# Patient Record
Sex: MALE | Race: WHITE | Employment: OTHER | URBAN - METROPOLITAN AREA
[De-identification: names, ages, dates, MRNs, and addresses within clinical notes are randomized per-mention and may not be internally consistent; named-entity substitution may affect disease eponyms.]

---

## 2017-01-01 ENCOUNTER — HOSPITAL ENCOUNTER (OUTPATIENT)
Dept: CT IMAGING | Age: 67
Discharge: HOME | End: 2017-10-04
Attending: INTERNAL MEDICINE | Admitting: INTERNAL MEDICINE

## 2017-01-01 ENCOUNTER — HOSPITAL ENCOUNTER (OUTPATIENT)
Dept: CT IMAGING | Age: 67
Discharge: HOME | End: 2017-09-08
Attending: INTERNAL MEDICINE | Admitting: INTERNAL MEDICINE

## 2017-01-01 DIAGNOSIS — C25.2 MALIGNANT NEOPLASM OF TAIL OF PANCREAS (HCC): ICD-10-CM

## 2017-01-01 DIAGNOSIS — C25.2 CANCER OF PANCREAS, TAIL (HCC): ICD-10-CM

## 2017-01-01 LAB
ALBUMIN SERPL-MCNC: 4 G/DL (ref 3.4–5)
ANION GAP SERPL CALCULATED.3IONS-SCNC: 7 MMOL/L (ref 3–16)
BUN BLDV-MCNC: 15 MG/DL (ref 7–20)
CALCIUM SERPL-MCNC: 8.7 MG/DL (ref 8.3–10.6)
CHLORIDE BLD-SCNC: 106 MMOL/L (ref 99–110)
CO2: 27 MMOL/L (ref 21–32)
CREAT SERPL-MCNC: 0.8 MG/DL (ref 0.8–1.3)
GFR AFRICAN AMERICAN: >60
GFR NON-AFRICAN AMERICAN: >60
GLUCOSE BLD-MCNC: 88 MG/DL (ref 70–99)
PHOSPHORUS: 3.6 MG/DL (ref 2.5–4.9)
POTASSIUM SERPL-SCNC: 4.7 MMOL/L (ref 3.5–5.1)
SODIUM BLD-SCNC: 140 MMOL/L (ref 136–145)

## 2017-01-03 ENCOUNTER — HOSPITAL ENCOUNTER (OUTPATIENT)
Dept: CT IMAGING | Age: 67
Discharge: HOME | End: 2017-01-03

## 2017-01-03 ENCOUNTER — HOSPITAL ENCOUNTER (OUTPATIENT)
Dept: OTHER | Age: 67
Discharge: HOME | End: 2017-01-03
Attending: INTERNAL MEDICINE | Admitting: INTERNAL MEDICINE

## 2017-01-03 VITALS
RESPIRATION RATE: 14 BRPM | OXYGEN SATURATION: 96 % | SYSTOLIC BLOOD PRESSURE: 108 MMHG | HEIGHT: 70 IN | TEMPERATURE: 73 F | WEIGHT: 170 LBS | DIASTOLIC BLOOD PRESSURE: 67 MMHG | HEART RATE: 77 BPM | BODY MASS INDEX: 24.34 KG/M2

## 2017-01-03 DIAGNOSIS — D50.0 IRON DEFICIENCY ANEMIA DUE TO CHRONIC BLOOD LOSS: ICD-10-CM

## 2017-01-03 DIAGNOSIS — D64.9 ANEMIA: ICD-10-CM

## 2017-01-03 LAB
INR BLD: 1.28 (ref 0.85–1.16)
PROTHROMBIN TIME: 14.7 SEC (ref 9.8–13)

## 2017-01-03 RX ORDER — MIDAZOLAM HYDROCHLORIDE 1 MG/ML
1 INJECTION INTRAMUSCULAR; INTRAVENOUS ONCE
Status: COMPLETED | OUTPATIENT
Start: 2017-01-03 | End: 2017-01-03

## 2017-01-03 RX ORDER — SODIUM CHLORIDE 9 MG/ML
INJECTION, SOLUTION INTRAVENOUS ONCE
Status: COMPLETED | OUTPATIENT
Start: 2017-01-03 | End: 2017-01-03

## 2017-01-03 RX ORDER — FENTANYL CITRATE 50 UG/ML
25 INJECTION, SOLUTION INTRAMUSCULAR; INTRAVENOUS ONCE
Status: COMPLETED | OUTPATIENT
Start: 2017-01-03 | End: 2017-01-03

## 2017-01-03 RX ADMIN — MIDAZOLAM HYDROCHLORIDE 1 MG: 1 INJECTION INTRAMUSCULAR; INTRAVENOUS at 10:38

## 2017-01-03 RX ADMIN — SODIUM CHLORIDE: 9 INJECTION, SOLUTION INTRAVENOUS at 09:30

## 2017-01-03 RX ADMIN — FENTANYL CITRATE 25 MCG: 50 INJECTION, SOLUTION INTRAMUSCULAR; INTRAVENOUS at 10:39

## 2017-01-03 ASSESSMENT — PAIN SCALES - GENERAL
PAINLEVEL_OUTOF10: 0
PAINLEVEL_OUTOF10: 0

## 2017-01-03 ASSESSMENT — PAIN - FUNCTIONAL ASSESSMENT: PAIN_FUNCTIONAL_ASSESSMENT: 0-10

## 2017-01-04 ENCOUNTER — HOSPITAL ENCOUNTER (OUTPATIENT)
Dept: CT IMAGING | Age: 67
Discharge: HOME | End: 2017-01-04
Attending: INTERNAL MEDICINE | Admitting: INTERNAL MEDICINE

## 2017-01-04 DIAGNOSIS — D64.9 ANEMIA: ICD-10-CM

## 2017-01-12 ENCOUNTER — HOSPITAL ENCOUNTER (OUTPATIENT)
Dept: SURGERY | Age: 67
Discharge: HOME | End: 2017-01-12
Attending: INTERNAL MEDICINE | Admitting: INTERNAL MEDICINE

## 2017-01-12 VITALS
WEIGHT: 171 LBS | HEIGHT: 70 IN | OXYGEN SATURATION: 94 % | SYSTOLIC BLOOD PRESSURE: 110 MMHG | BODY MASS INDEX: 24.48 KG/M2 | HEART RATE: 79 BPM | TEMPERATURE: 98.5 F | DIASTOLIC BLOOD PRESSURE: 77 MMHG | RESPIRATION RATE: 14 BRPM

## 2017-01-12 PROBLEM — C78.7 LIVER METASTASIS (HCC): Status: ACTIVE | Noted: 2017-01-12

## 2017-01-12 LAB
ABO/RH: NORMAL
ANTIBODY SCREEN: NORMAL
BASOPHILS ABSOLUTE: 0.1 K/UL (ref 0–0.2)
BASOPHILS RELATIVE PERCENT: 0.5 %
EOSINOPHILS ABSOLUTE: 0.2 K/UL (ref 0–0.6)
EOSINOPHILS RELATIVE PERCENT: 1.7 %
HCT VFR BLD CALC: 21.4 % (ref 40.5–52.5)
HEMOGLOBIN: 6.9 G/DL (ref 13.5–17.5)
LYMPHOCYTES ABSOLUTE: 0.7 K/UL (ref 1–5.1)
LYMPHOCYTES RELATIVE PERCENT: 6.6 %
MCH RBC QN AUTO: 26.1 PG (ref 26–34)
MCHC RBC AUTO-ENTMCNC: 32.3 G/DL (ref 31–36)
MCV RBC AUTO: 80.9 FL (ref 80–100)
MONOCYTES ABSOLUTE: 0.9 K/UL (ref 0–1.3)
MONOCYTES RELATIVE PERCENT: 8.5 %
NEUTROPHILS ABSOLUTE: 9 K/UL (ref 1.7–7.7)
NEUTROPHILS RELATIVE PERCENT: 82.7 %
PDW BLD-RTO: 19.2 % (ref 12.4–15.4)
PLATELET # BLD: 211 K/UL (ref 135–450)
PMV BLD AUTO: 6.7 FL (ref 5–10.5)
RBC # BLD: 2.64 M/UL (ref 4.2–5.9)
WBC # BLD: 10.9 K/UL (ref 4–11)

## 2017-01-12 RX ORDER — LIDOCAINE HYDROCHLORIDE 10 MG/ML
0.1 INJECTION, SOLUTION EPIDURAL; INFILTRATION; INTRACAUDAL; PERINEURAL ONCE
Status: DISCONTINUED | OUTPATIENT
Start: 2017-01-12 | End: 2017-01-13 | Stop reason: HOSPADM

## 2017-01-12 RX ORDER — SODIUM CHLORIDE, SODIUM LACTATE, POTASSIUM CHLORIDE, CALCIUM CHLORIDE 600; 310; 30; 20 MG/100ML; MG/100ML; MG/100ML; MG/100ML
INJECTION, SOLUTION INTRAVENOUS CONTINUOUS
Status: DISCONTINUED | OUTPATIENT
Start: 2017-01-12 | End: 2017-01-13 | Stop reason: HOSPADM

## 2017-01-12 RX ADMIN — SODIUM CHLORIDE, SODIUM LACTATE, POTASSIUM CHLORIDE, CALCIUM CHLORIDE: 600; 310; 30; 20 INJECTION, SOLUTION INTRAVENOUS at 10:32

## 2017-01-12 ASSESSMENT — PAIN SCALES - GENERAL: PAINLEVEL_OUTOF10: 0

## 2017-01-13 ENCOUNTER — HOSPITAL ENCOUNTER (OUTPATIENT)
Dept: ONCOLOGY | Age: 67
Discharge: HOME | End: 2017-01-31
Admitting: INTERNAL MEDICINE

## 2017-01-13 VITALS
BODY MASS INDEX: 24.34 KG/M2 | RESPIRATION RATE: 16 BRPM | DIASTOLIC BLOOD PRESSURE: 72 MMHG | HEART RATE: 82 BPM | WEIGHT: 170 LBS | HEIGHT: 70 IN | TEMPERATURE: 98.1 F | SYSTOLIC BLOOD PRESSURE: 106 MMHG

## 2017-01-13 LAB
BLOOD BANK DISPENSE STATUS: NORMAL
BLOOD BANK DISPENSE STATUS: NORMAL
BLOOD BANK PRODUCT CODE: NORMAL
BLOOD BANK PRODUCT CODE: NORMAL
BPU ID: NORMAL
BPU ID: NORMAL
DESCRIPTION BLOOD BANK: NORMAL
DESCRIPTION BLOOD BANK: NORMAL

## 2017-01-13 RX ORDER — ACETAMINOPHEN 325 MG/1
650 TABLET ORAL ONCE
Status: COMPLETED | OUTPATIENT
Start: 2017-01-13 | End: 2017-01-13

## 2017-01-13 RX ORDER — DIPHENHYDRAMINE HCL 25 MG
25 TABLET ORAL ONCE
Status: COMPLETED | OUTPATIENT
Start: 2017-01-13 | End: 2017-01-13

## 2017-01-13 RX ADMIN — Medication 25 MG: at 08:27

## 2017-01-13 RX ADMIN — ACETAMINOPHEN 650 MG: 325 TABLET ORAL at 08:27

## 2017-01-13 ASSESSMENT — PAIN SCALES - GENERAL: PAINLEVEL_OUTOF10: 0

## 2017-01-19 PROBLEM — E46 PROTEIN CALORIE MALNUTRITION (HCC): Status: ACTIVE | Noted: 2017-01-19

## 2017-01-19 PROBLEM — R63.4 WEIGHT LOSS: Status: ACTIVE | Noted: 2017-01-19

## 2017-01-31 ENCOUNTER — SURG/PROC ORDERS (OUTPATIENT)
Dept: SURGERY | Age: 67
End: 2017-01-31

## 2017-01-31 ENCOUNTER — TELEPHONE (OUTPATIENT)
Dept: SURGERY | Age: 67
End: 2017-01-31

## 2017-01-31 RX ORDER — SODIUM CHLORIDE 0.9 % (FLUSH) 0.9 %
10 SYRINGE (ML) INJECTION PRN
Status: CANCELLED | OUTPATIENT
Start: 2017-01-31

## 2017-01-31 RX ORDER — SODIUM CHLORIDE 0.9 % (FLUSH) 0.9 %
10 SYRINGE (ML) INJECTION EVERY 12 HOURS SCHEDULED
Status: CANCELLED | OUTPATIENT
Start: 2017-01-31

## 2017-02-01 ENCOUNTER — HOSPITAL ENCOUNTER (OUTPATIENT)
Dept: SURGERY | Age: 67
Discharge: HOME | End: 2017-02-01
Attending: SURGERY | Admitting: SURGERY

## 2017-02-01 VITALS
WEIGHT: 154.2 LBS | DIASTOLIC BLOOD PRESSURE: 57 MMHG | HEIGHT: 70 IN | HEART RATE: 86 BPM | TEMPERATURE: 98.1 F | BODY MASS INDEX: 22.07 KG/M2 | RESPIRATION RATE: 16 BRPM | SYSTOLIC BLOOD PRESSURE: 115 MMHG | OXYGEN SATURATION: 99 %

## 2017-02-01 DIAGNOSIS — R52 PAIN: ICD-10-CM

## 2017-02-01 LAB
INR BLD: 1.17 (ref 0.85–1.16)
PROTHROMBIN TIME: 13.4 SEC (ref 9.8–13)

## 2017-02-01 PROCEDURE — 77001 FLUOROGUIDE FOR VEIN DEVICE: CPT | Performed by: SURGERY

## 2017-02-01 PROCEDURE — 36561 INSERT TUNNELED CV CATH: CPT | Performed by: SURGERY

## 2017-02-01 RX ORDER — LABETALOL HYDROCHLORIDE 5 MG/ML
5 INJECTION, SOLUTION INTRAVENOUS EVERY 10 MIN PRN
Status: DISCONTINUED | OUTPATIENT
Start: 2017-02-01 | End: 2017-02-02 | Stop reason: HOSPADM

## 2017-02-01 RX ORDER — PROMETHAZINE HYDROCHLORIDE 25 MG/ML
6.25 INJECTION, SOLUTION INTRAMUSCULAR; INTRAVENOUS
Status: DISCONTINUED | OUTPATIENT
Start: 2017-02-01 | End: 2017-02-02 | Stop reason: HOSPADM

## 2017-02-01 RX ORDER — ONDANSETRON 2 MG/ML
4 INJECTION INTRAMUSCULAR; INTRAVENOUS PRN
Status: DISCONTINUED | OUTPATIENT
Start: 2017-02-01 | End: 2017-02-02 | Stop reason: HOSPADM

## 2017-02-01 RX ORDER — LIDOCAINE HYDROCHLORIDE 10 MG/ML
0.3 INJECTION, SOLUTION EPIDURAL; INFILTRATION; INTRACAUDAL; PERINEURAL
Status: ACTIVE | OUTPATIENT
Start: 2017-02-01 | End: 2017-02-01

## 2017-02-01 RX ORDER — HYDRALAZINE HYDROCHLORIDE 20 MG/ML
5 INJECTION INTRAMUSCULAR; INTRAVENOUS EVERY 10 MIN PRN
Status: DISCONTINUED | OUTPATIENT
Start: 2017-02-01 | End: 2017-02-02 | Stop reason: HOSPADM

## 2017-02-01 RX ORDER — MEPERIDINE HYDROCHLORIDE 50 MG/ML
12.5 INJECTION INTRAMUSCULAR; INTRAVENOUS; SUBCUTANEOUS EVERY 5 MIN PRN
Status: DISCONTINUED | OUTPATIENT
Start: 2017-02-01 | End: 2017-02-02 | Stop reason: HOSPADM

## 2017-02-01 RX ORDER — OXYCODONE HYDROCHLORIDE AND ACETAMINOPHEN 5; 325 MG/1; MG/1
2 TABLET ORAL PRN
Status: DISPENSED | OUTPATIENT
Start: 2017-02-01 | End: 2017-02-01

## 2017-02-01 RX ORDER — OXYCODONE HYDROCHLORIDE AND ACETAMINOPHEN 5; 325 MG/1; MG/1
1 TABLET ORAL PRN
Status: ACTIVE | OUTPATIENT
Start: 2017-02-01 | End: 2017-02-01

## 2017-02-01 RX ORDER — SODIUM CHLORIDE 0.9 % (FLUSH) 0.9 %
10 SYRINGE (ML) INJECTION EVERY 12 HOURS SCHEDULED
Status: DISCONTINUED | OUTPATIENT
Start: 2017-02-01 | End: 2017-02-02 | Stop reason: HOSPADM

## 2017-02-01 RX ORDER — SODIUM CHLORIDE, SODIUM LACTATE, POTASSIUM CHLORIDE, CALCIUM CHLORIDE 600; 310; 30; 20 MG/100ML; MG/100ML; MG/100ML; MG/100ML
INJECTION, SOLUTION INTRAVENOUS CONTINUOUS
Status: DISCONTINUED | OUTPATIENT
Start: 2017-02-01 | End: 2017-02-02 | Stop reason: HOSPADM

## 2017-02-01 RX ORDER — DIPHENHYDRAMINE HYDROCHLORIDE 50 MG/ML
12.5 INJECTION INTRAMUSCULAR; INTRAVENOUS
Status: ACTIVE | OUTPATIENT
Start: 2017-02-01 | End: 2017-02-01

## 2017-02-01 RX ORDER — MORPHINE SULFATE 2 MG/ML
2 INJECTION, SOLUTION INTRAMUSCULAR; INTRAVENOUS EVERY 5 MIN PRN
Status: DISCONTINUED | OUTPATIENT
Start: 2017-02-01 | End: 2017-02-02 | Stop reason: HOSPADM

## 2017-02-01 RX ORDER — SODIUM CHLORIDE 0.9 % (FLUSH) 0.9 %
10 SYRINGE (ML) INJECTION PRN
Status: DISCONTINUED | OUTPATIENT
Start: 2017-02-01 | End: 2017-02-02 | Stop reason: HOSPADM

## 2017-02-01 RX ORDER — MORPHINE SULFATE 2 MG/ML
1 INJECTION, SOLUTION INTRAMUSCULAR; INTRAVENOUS EVERY 5 MIN PRN
Status: DISCONTINUED | OUTPATIENT
Start: 2017-02-01 | End: 2017-02-02 | Stop reason: HOSPADM

## 2017-02-01 ASSESSMENT — PAIN SCALES - GENERAL: PAINLEVEL_OUTOF10: 3

## 2017-02-01 ASSESSMENT — PAIN - FUNCTIONAL ASSESSMENT: PAIN_FUNCTIONAL_ASSESSMENT: 0-10

## 2017-02-01 ASSESSMENT — PAIN DESCRIPTION - PAIN TYPE: TYPE: SURGICAL PAIN

## 2017-02-01 ASSESSMENT — PAIN DESCRIPTION - ORIENTATION: ORIENTATION: LEFT

## 2017-02-01 ASSESSMENT — PAIN DESCRIPTION - LOCATION: LOCATION: CHEST;INCISION

## 2017-02-01 ASSESSMENT — PAIN DESCRIPTION - DESCRIPTORS: DESCRIPTORS: ACHING

## 2017-02-03 ENCOUNTER — HOSPITAL ENCOUNTER (OUTPATIENT)
Dept: VASCULAR LAB | Age: 67
Discharge: HOME | End: 2017-02-03
Attending: INTERNAL MEDICINE | Admitting: INTERNAL MEDICINE

## 2017-02-03 DIAGNOSIS — R60.0 LOCALIZED EDEMA: ICD-10-CM

## 2017-02-11 PROBLEM — R55 POSTURAL DIZZINESS WITH PRESYNCOPE: Status: ACTIVE | Noted: 2017-02-11

## 2017-02-11 PROBLEM — I95.1 ORTHOSTATIC HYPOTENSION: Status: ACTIVE | Noted: 2017-02-11

## 2017-02-11 PROBLEM — D62 ACUTE BLOOD LOSS ANEMIA: Status: ACTIVE | Noted: 2017-02-11

## 2017-02-11 PROBLEM — R42 POSTURAL DIZZINESS WITH PRESYNCOPE: Status: ACTIVE | Noted: 2017-02-11

## 2017-02-12 PROBLEM — K92.2 GASTROINTESTINAL BLEEDING, UPPER: Chronic | Status: ACTIVE | Noted: 2017-02-12

## 2017-03-02 LAB
ABO/RH: NORMAL
ANTIBODY SCREEN: NORMAL

## 2017-03-03 ENCOUNTER — HOSPITAL ENCOUNTER (OUTPATIENT)
Dept: ONCOLOGY | Age: 67
Discharge: HOME | End: 2017-03-22
Attending: NURSE PRACTITIONER | Admitting: NURSE PRACTITIONER

## 2017-03-03 VITALS
TEMPERATURE: 97.4 F | HEART RATE: 68 BPM | SYSTOLIC BLOOD PRESSURE: 98 MMHG | BODY MASS INDEX: 20.9 KG/M2 | RESPIRATION RATE: 16 BRPM | WEIGHT: 146 LBS | HEIGHT: 70 IN | DIASTOLIC BLOOD PRESSURE: 51 MMHG

## 2017-03-03 RX ORDER — DIPHENHYDRAMINE HCL 25 MG
25 TABLET ORAL ONCE
Status: COMPLETED | OUTPATIENT
Start: 2017-03-03 | End: 2017-03-03

## 2017-03-03 RX ORDER — ACETAMINOPHEN 325 MG/1
650 TABLET ORAL ONCE
Status: COMPLETED | OUTPATIENT
Start: 2017-03-03 | End: 2017-03-03

## 2017-03-03 RX ADMIN — Medication 25 MG: at 09:05

## 2017-03-03 RX ADMIN — ACETAMINOPHEN 650 MG: 325 TABLET ORAL at 09:04

## 2017-03-03 ASSESSMENT — PAIN - FUNCTIONAL ASSESSMENT: PAIN_FUNCTIONAL_ASSESSMENT: 0-10

## 2017-03-03 ASSESSMENT — PAIN SCALES - GENERAL: PAINLEVEL_OUTOF10: 0

## 2017-05-31 ENCOUNTER — HOSPITAL ENCOUNTER (OUTPATIENT)
Dept: CT IMAGING | Age: 67
Discharge: HOME | End: 2017-05-31
Attending: NURSE PRACTITIONER | Admitting: NURSE PRACTITIONER

## 2017-05-31 DIAGNOSIS — C78.7 LIVER METASTASIS (HCC): ICD-10-CM

## 2017-05-31 DIAGNOSIS — C78.7 SECONDARY MALIGNANT NEOPLASM OF LIVER AND INTRAHEPATIC BILE DUCT (HCC): ICD-10-CM

## 2017-05-31 DIAGNOSIS — C25.2 MALIGNANT NEOPLASM OF TAIL OF PANCREAS (HCC): ICD-10-CM

## 2017-12-07 PROBLEM — K92.2 ACUTE GI BLEEDING: Status: ACTIVE | Noted: 2017-01-01

## 2018-01-01 ENCOUNTER — ANESTHESIA (OUTPATIENT)
Dept: OPERATING ROOM | Age: 68
DRG: 330 | End: 2018-01-01
Payer: MEDICARE

## 2018-01-01 ENCOUNTER — APPOINTMENT (OUTPATIENT)
Dept: CT IMAGING | Age: 68
DRG: 871 | End: 2018-01-01
Payer: MEDICARE

## 2018-01-01 ENCOUNTER — ANESTHESIA EVENT (OUTPATIENT)
Dept: OPERATING ROOM | Age: 68
DRG: 330 | End: 2018-01-01
Payer: MEDICARE

## 2018-01-01 ENCOUNTER — APPOINTMENT (OUTPATIENT)
Dept: GENERAL RADIOLOGY | Age: 68
DRG: 330 | End: 2018-01-01
Payer: MEDICARE

## 2018-01-01 ENCOUNTER — HOSPITAL ENCOUNTER (OUTPATIENT)
Dept: CT IMAGING | Age: 68
Discharge: HOME | End: 2018-04-18
Attending: INTERNAL MEDICINE | Admitting: INTERNAL MEDICINE

## 2018-01-01 ENCOUNTER — APPOINTMENT (OUTPATIENT)
Dept: GENERAL RADIOLOGY | Age: 68
DRG: 871 | End: 2018-01-01
Payer: MEDICARE

## 2018-01-01 ENCOUNTER — HOSPITAL ENCOUNTER (OUTPATIENT)
Dept: CT IMAGING | Age: 68
Discharge: HOME | End: 2018-01-22
Attending: INTERNAL MEDICINE | Admitting: INTERNAL MEDICINE

## 2018-01-01 ENCOUNTER — APPOINTMENT (OUTPATIENT)
Dept: CT IMAGING | Age: 68
DRG: 330 | End: 2018-01-01
Payer: MEDICARE

## 2018-01-01 ENCOUNTER — HOSPITAL ENCOUNTER (INPATIENT)
Age: 68
LOS: 8 days | Discharge: HOME HEALTH | DRG: 330 | End: 2018-07-25
Attending: EMERGENCY MEDICINE | Admitting: INTERNAL MEDICINE
Payer: MEDICARE

## 2018-01-01 ENCOUNTER — OFFICE VISIT (OUTPATIENT)
Dept: SURGERY | Age: 68
End: 2018-01-01

## 2018-01-01 ENCOUNTER — HOSPITAL ENCOUNTER (INPATIENT)
Age: 68
LOS: 3 days | Discharge: HOSPICE | DRG: 871 | End: 2018-08-16
Attending: EMERGENCY MEDICINE | Admitting: INTERNAL MEDICINE
Payer: MEDICARE

## 2018-01-01 VITALS
TEMPERATURE: 98.4 F | RESPIRATION RATE: 22 BRPM | DIASTOLIC BLOOD PRESSURE: 72 MMHG | BODY MASS INDEX: 20 KG/M2 | OXYGEN SATURATION: 96 % | HEIGHT: 71 IN | SYSTOLIC BLOOD PRESSURE: 109 MMHG | WEIGHT: 142.86 LBS | HEART RATE: 113 BPM

## 2018-01-01 VITALS
HEART RATE: 99 BPM | RESPIRATION RATE: 16 BRPM | WEIGHT: 153.22 LBS | OXYGEN SATURATION: 96 % | HEIGHT: 71 IN | TEMPERATURE: 98.8 F | DIASTOLIC BLOOD PRESSURE: 72 MMHG | SYSTOLIC BLOOD PRESSURE: 107 MMHG | BODY MASS INDEX: 21.45 KG/M2

## 2018-01-01 VITALS
BODY MASS INDEX: 20.16 KG/M2 | SYSTOLIC BLOOD PRESSURE: 105 MMHG | HEART RATE: 114 BPM | WEIGHT: 144 LBS | HEIGHT: 71 IN | DIASTOLIC BLOOD PRESSURE: 62 MMHG

## 2018-01-01 VITALS
TEMPERATURE: 98.6 F | DIASTOLIC BLOOD PRESSURE: 79 MMHG | SYSTOLIC BLOOD PRESSURE: 129 MMHG | OXYGEN SATURATION: 100 % | RESPIRATION RATE: 24 BRPM

## 2018-01-01 DIAGNOSIS — K92.0 HEMATEMESIS WITH NAUSEA: Primary | ICD-10-CM

## 2018-01-01 DIAGNOSIS — C25.9 PANCREATIC CANCER METASTASIZED TO LIVER (HCC): ICD-10-CM

## 2018-01-01 DIAGNOSIS — C78.7 LIVER METASTASIS (HCC): ICD-10-CM

## 2018-01-01 DIAGNOSIS — R65.21 SEPTIC SHOCK (HCC): Primary | ICD-10-CM

## 2018-01-01 DIAGNOSIS — A41.9 SEPTIC SHOCK (HCC): Primary | ICD-10-CM

## 2018-01-01 DIAGNOSIS — C25.2 MALIGNANT NEOPLASM OF TAIL OF PANCREAS (HCC): ICD-10-CM

## 2018-01-01 DIAGNOSIS — J18.9 PNEUMONIA OF LEFT LOWER LOBE DUE TO INFECTIOUS ORGANISM: ICD-10-CM

## 2018-01-01 DIAGNOSIS — N30.00 ACUTE CYSTITIS WITHOUT HEMATURIA: ICD-10-CM

## 2018-01-01 DIAGNOSIS — C78.7 SECONDARY MALIGNANT NEOPLASM OF LIVER (HCC): ICD-10-CM

## 2018-01-01 DIAGNOSIS — C78.7 PANCREATIC CANCER METASTASIZED TO LIVER (HCC): ICD-10-CM

## 2018-01-01 DIAGNOSIS — C25.2 CANCER OF PANCREAS, TAIL (HCC): ICD-10-CM

## 2018-01-01 DIAGNOSIS — F41.9 ANXIETY: ICD-10-CM

## 2018-01-01 DIAGNOSIS — C78.7 SECONDARY MALIGNANT NEOPLASM OF LIVER AND INTRAHEPATIC BILE DUCT (HCC): ICD-10-CM

## 2018-01-01 DIAGNOSIS — K56.609 COLONIC OBSTRUCTION (HCC): Primary | ICD-10-CM

## 2018-01-01 LAB
A/G RATIO: 0.6 (ref 1.1–2.2)
A/G RATIO: 0.9 (ref 1.1–2.2)
A/G RATIO: 0.9 (ref 1.1–2.2)
A/G RATIO: 1 (ref 1.1–2.2)
ABO/RH: NORMAL
ABO/RH: NORMAL
ALBUMIN SERPL-MCNC: 2.2 G/DL (ref 3.4–5)
ALBUMIN SERPL-MCNC: 2.6 G/DL (ref 3.4–5)
ALBUMIN SERPL-MCNC: 3.2 G/DL (ref 3.4–5)
ALBUMIN SERPL-MCNC: 3.3 G/DL (ref 3.4–5)
ALP BLD-CCNC: 222 U/L (ref 40–129)
ALP BLD-CCNC: 248 U/L (ref 40–129)
ALP BLD-CCNC: 324 U/L (ref 40–129)
ALP BLD-CCNC: 435 U/L (ref 40–129)
ALT SERPL-CCNC: 11 U/L (ref 10–40)
ALT SERPL-CCNC: 15 U/L (ref 10–40)
ALT SERPL-CCNC: 33 U/L (ref 10–40)
ALT SERPL-CCNC: 8 U/L (ref 10–40)
AMMONIA: 29 UMOL/L (ref 16–60)
AMORPHOUS: ABNORMAL /HPF
ANION GAP SERPL CALCULATED.3IONS-SCNC: 11 MMOL/L (ref 3–16)
ANION GAP SERPL CALCULATED.3IONS-SCNC: 17 MMOL/L (ref 3–16)
ANION GAP SERPL CALCULATED.3IONS-SCNC: 6 MMOL/L (ref 3–16)
ANION GAP SERPL CALCULATED.3IONS-SCNC: 7 MMOL/L (ref 3–16)
ANION GAP SERPL CALCULATED.3IONS-SCNC: 7 MMOL/L (ref 3–16)
ANION GAP SERPL CALCULATED.3IONS-SCNC: 8 MMOL/L (ref 3–16)
ANION GAP SERPL CALCULATED.3IONS-SCNC: 8 MMOL/L (ref 3–16)
ANION GAP SERPL CALCULATED.3IONS-SCNC: 9 MMOL/L (ref 3–16)
ANISOCYTOSIS: ABNORMAL
ANTIBODY SCREEN: NORMAL
ANTIBODY SCREEN: NORMAL
APPEARANCE FLUID: NORMAL
APTT: 29.3 SEC (ref 26–36)
APTT: 35.4 SEC (ref 26–36)
AST SERPL-CCNC: 16 U/L (ref 15–37)
AST SERPL-CCNC: 23 U/L (ref 15–37)
AST SERPL-CCNC: 24 U/L (ref 15–37)
AST SERPL-CCNC: 26 U/L (ref 15–37)
BACTERIA: ABNORMAL /HPF
BANDED NEUTROPHILS RELATIVE PERCENT: 12 % (ref 0–7)
BANDED NEUTROPHILS RELATIVE PERCENT: 51 % (ref 0–7)
BASE EXCESS VENOUS: -5 MMOL/L (ref -3–3)
BASOPHILS ABSOLUTE: 0 K/UL (ref 0–0.2)
BASOPHILS ABSOLUTE: 0.2 K/UL (ref 0–0.2)
BASOPHILS RELATIVE PERCENT: 0 %
BASOPHILS RELATIVE PERCENT: 0 %
BASOPHILS RELATIVE PERCENT: 0.1 %
BASOPHILS RELATIVE PERCENT: 0.1 %
BASOPHILS RELATIVE PERCENT: 0.2 %
BASOPHILS RELATIVE PERCENT: 1.3 %
BILIRUB SERPL-MCNC: 0.4 MG/DL (ref 0–1)
BILIRUB SERPL-MCNC: 0.4 MG/DL (ref 0–1)
BILIRUB SERPL-MCNC: 1.4 MG/DL (ref 0–1)
BILIRUB SERPL-MCNC: <0.2 MG/DL (ref 0–1)
BILIRUBIN URINE: NEGATIVE
BILIRUBIN URINE: NEGATIVE
BLOOD BANK DISPENSE STATUS: NORMAL
BLOOD BANK PRODUCT CODE: NORMAL
BLOOD CULTURE, ROUTINE: ABNORMAL
BLOOD, URINE: ABNORMAL
BLOOD, URINE: NEGATIVE
BODY FLUID CULTURE, STERILE: NORMAL
BPU ID: NORMAL
BUN BLDV-MCNC: 10 MG/DL (ref 7–20)
BUN BLDV-MCNC: 10 MG/DL (ref 7–20)
BUN BLDV-MCNC: 11 MG/DL (ref 7–20)
BUN BLDV-MCNC: 11 MG/DL (ref 7–20)
BUN BLDV-MCNC: 12 MG/DL (ref 7–20)
BUN BLDV-MCNC: 16 MG/DL (ref 7–20)
BUN BLDV-MCNC: 17 MG/DL (ref 7–20)
BUN BLDV-MCNC: 21 MG/DL (ref 7–20)
BUN BLDV-MCNC: 21 MG/DL (ref 7–20)
BUN BLDV-MCNC: 56 MG/DL (ref 7–20)
BUN BLDV-MCNC: 61 MG/DL (ref 7–20)
BURR CELLS: ABNORMAL
CA 19-9: 1995 U/ML (ref 0–35)
CALCIUM SERPL-MCNC: 11.5 MG/DL (ref 8.3–10.6)
CALCIUM SERPL-MCNC: 13.9 MG/DL (ref 8.3–10.6)
CALCIUM SERPL-MCNC: 8.3 MG/DL (ref 8.3–10.6)
CALCIUM SERPL-MCNC: 8.5 MG/DL (ref 8.3–10.6)
CALCIUM SERPL-MCNC: 8.5 MG/DL (ref 8.3–10.6)
CALCIUM SERPL-MCNC: 8.6 MG/DL (ref 8.3–10.6)
CALCIUM SERPL-MCNC: 8.7 MG/DL (ref 8.3–10.6)
CALCIUM SERPL-MCNC: 8.8 MG/DL (ref 8.3–10.6)
CALCIUM SERPL-MCNC: 9 MG/DL (ref 8.3–10.6)
CALCIUM SERPL-MCNC: 9.1 MG/DL (ref 8.3–10.6)
CALCIUM SERPL-MCNC: 9.1 MG/DL (ref 8.3–10.6)
CARBOXYHEMOGLOBIN: 2.3 % (ref 0–1.5)
CELL COUNT FLUID TYPE: NORMAL
CHLORIDE BLD-SCNC: 100 MMOL/L (ref 99–110)
CHLORIDE BLD-SCNC: 100 MMOL/L (ref 99–110)
CHLORIDE BLD-SCNC: 101 MMOL/L (ref 99–110)
CHLORIDE BLD-SCNC: 103 MMOL/L (ref 99–110)
CHLORIDE BLD-SCNC: 104 MMOL/L (ref 99–110)
CHLORIDE BLD-SCNC: 104 MMOL/L (ref 99–110)
CHLORIDE BLD-SCNC: 105 MMOL/L (ref 99–110)
CHLORIDE BLD-SCNC: 105 MMOL/L (ref 99–110)
CHLORIDE BLD-SCNC: 98 MMOL/L (ref 99–110)
CLARITY: ABNORMAL
CLARITY: CLEAR
CLOT EVALUATION: NORMAL
CO2: 20 MMOL/L (ref 21–32)
CO2: 22 MMOL/L (ref 21–32)
CO2: 24 MMOL/L (ref 21–32)
CO2: 24 MMOL/L (ref 21–32)
CO2: 25 MMOL/L (ref 21–32)
CO2: 27 MMOL/L (ref 21–32)
COLOR FLUID: NORMAL
COLOR: YELLOW
COLOR: YELLOW
CREAT SERPL-MCNC: 0.6 MG/DL (ref 0.8–1.3)
CREAT SERPL-MCNC: 0.7 MG/DL (ref 0.8–1.3)
CREAT SERPL-MCNC: 0.8 MG/DL (ref 0.8–1.3)
CREAT SERPL-MCNC: 1 MG/DL (ref 0.8–1.3)
CREAT SERPL-MCNC: 2.6 MG/DL (ref 0.8–1.3)
CREAT SERPL-MCNC: 2.7 MG/DL (ref 0.8–1.3)
CREAT SERPL-MCNC: <0.5 MG/DL (ref 0.8–1.3)
DESCRIPTION BLOOD BANK: NORMAL
EKG ATRIAL RATE: 145 BPM
EKG ATRIAL RATE: 71 BPM
EKG DIAGNOSIS: NORMAL
EKG DIAGNOSIS: NORMAL
EKG P AXIS: 64 DEGREES
EKG P-R INTERVAL: 128 MS
EKG P-R INTERVAL: 174 MS
EKG Q-T INTERVAL: 326 MS
EKG Q-T INTERVAL: 416 MS
EKG QRS DURATION: 78 MS
EKG QRS DURATION: 84 MS
EKG QTC CALCULATION (BAZETT): 452 MS
EKG QTC CALCULATION (BAZETT): 506 MS
EKG R AXIS: -11 DEGREES
EKG R AXIS: 7 DEGREES
EKG T AXIS: -16 DEGREES
EKG T AXIS: 29 DEGREES
EKG VENTRICULAR RATE: 145 BPM
EKG VENTRICULAR RATE: 71 BPM
EOSINOPHILS ABSOLUTE: 0 K/UL (ref 0–0.6)
EOSINOPHILS RELATIVE PERCENT: 0 %
EOSINOPHILS RELATIVE PERCENT: 0.1 %
EOSINOPHILS RELATIVE PERCENT: 0.4 %
EPITHELIAL CELLS, UA: ABNORMAL /HPF
FLUID PATH CONSULT: YES
FLUID TYPE: NORMAL
GFR AFRICAN AMERICAN: 29
GFR AFRICAN AMERICAN: 30
GFR AFRICAN AMERICAN: >60
GFR NON-AFRICAN AMERICAN: 24
GFR NON-AFRICAN AMERICAN: 25
GFR NON-AFRICAN AMERICAN: >60
GLOBULIN: 2.6 G/DL
GLOBULIN: 3.4 G/DL
GLOBULIN: 3.5 G/DL
GLOBULIN: 3.6 G/DL
GLUCOSE BLD-MCNC: 101 MG/DL (ref 70–99)
GLUCOSE BLD-MCNC: 109 MG/DL (ref 70–99)
GLUCOSE BLD-MCNC: 113 MG/DL (ref 70–99)
GLUCOSE BLD-MCNC: 122 MG/DL (ref 70–99)
GLUCOSE BLD-MCNC: 129 MG/DL (ref 70–99)
GLUCOSE BLD-MCNC: 148 MG/DL (ref 70–99)
GLUCOSE BLD-MCNC: 67 MG/DL (ref 70–99)
GLUCOSE BLD-MCNC: 75 MG/DL (ref 70–99)
GLUCOSE BLD-MCNC: 78 MG/DL (ref 70–99)
GLUCOSE BLD-MCNC: 89 MG/DL (ref 70–99)
GLUCOSE BLD-MCNC: 92 MG/DL (ref 70–99)
GLUCOSE BLD-MCNC: 93 MG/DL (ref 70–99)
GLUCOSE URINE: NEGATIVE MG/DL
GLUCOSE URINE: NEGATIVE MG/DL
GLUCOSE, FLUID: 79 MG/DL
GRAM STAIN RESULT: NORMAL
HCO3 VENOUS: 19.7 MMOL/L (ref 23–29)
HCT VFR BLD CALC: 21 % (ref 40.5–52.5)
HCT VFR BLD CALC: 21.2 % (ref 40.5–52.5)
HCT VFR BLD CALC: 23.3 % (ref 40.5–52.5)
HCT VFR BLD CALC: 23.6 % (ref 40.5–52.5)
HCT VFR BLD CALC: 24.8 % (ref 40.5–52.5)
HCT VFR BLD CALC: 26.2 % (ref 40.5–52.5)
HCT VFR BLD CALC: 26.4 % (ref 40.5–52.5)
HCT VFR BLD CALC: 28.9 % (ref 40.5–52.5)
HCT VFR BLD CALC: 30.1 % (ref 40.5–52.5)
HCT VFR BLD CALC: 30.9 % (ref 40.5–52.5)
HEMATOLOGY PATH CONSULT: NORMAL
HEMOGLOBIN: 10.4 G/DL (ref 13.5–17.5)
HEMOGLOBIN: 6.7 G/DL (ref 13.5–17.5)
HEMOGLOBIN: 6.9 G/DL (ref 13.5–17.5)
HEMOGLOBIN: 7.4 G/DL (ref 13.5–17.5)
HEMOGLOBIN: 7.6 G/DL (ref 13.5–17.5)
HEMOGLOBIN: 8.1 G/DL (ref 13.5–17.5)
HEMOGLOBIN: 8.7 G/DL (ref 13.5–17.5)
HEMOGLOBIN: 9 G/DL (ref 13.5–17.5)
HEMOGLOBIN: 9.3 G/DL (ref 13.5–17.5)
HEMOGLOBIN: 9.8 G/DL (ref 13.5–17.5)
HYPOCHROMIA: ABNORMAL
INR BLD: 1.18 (ref 0.86–1.14)
INR BLD: 1.31 (ref 0.86–1.14)
INR BLD: 1.5 (ref 0.86–1.14)
KETONES, URINE: 15 MG/DL
KETONES, URINE: NEGATIVE MG/DL
LACTATE DEHYDROGENASE: 201 U/L (ref 100–190)
LACTIC ACID, SEPSIS: 6.5 MMOL/L (ref 0.4–1.9)
LACTIC ACID, SEPSIS: 7.3 MMOL/L (ref 0.4–1.9)
LACTIC ACID: 3.3 MMOL/L (ref 0.4–2)
LACTIC ACID: 9.5 MMOL/L (ref 0.4–2)
LD, FLUID: 91 U/L
LEUKOCYTE ESTERASE, URINE: ABNORMAL
LEUKOCYTE ESTERASE, URINE: NEGATIVE
LYMPHOCYTES ABSOLUTE: 0.2 K/UL (ref 1–5.1)
LYMPHOCYTES ABSOLUTE: 0.5 K/UL (ref 1–5.1)
LYMPHOCYTES ABSOLUTE: 0.9 K/UL (ref 1–5.1)
LYMPHOCYTES ABSOLUTE: 1.1 K/UL (ref 1–5.1)
LYMPHOCYTES ABSOLUTE: 1.2 K/UL (ref 1–5.1)
LYMPHOCYTES ABSOLUTE: 1.4 K/UL (ref 1–5.1)
LYMPHOCYTES RELATIVE PERCENT: 2 %
LYMPHOCYTES RELATIVE PERCENT: 2.7 %
LYMPHOCYTES RELATIVE PERCENT: 4 %
LYMPHOCYTES RELATIVE PERCENT: 6.3 %
LYMPHOCYTES RELATIVE PERCENT: 7.4 %
LYMPHOCYTES RELATIVE PERCENT: 8 %
LYMPHOCYTES, BODY FLUID: 69 %
MACROPHAGE FLUID: 5 %
MAGNESIUM: 1.9 MG/DL (ref 1.8–2.4)
MAGNESIUM: 2 MG/DL (ref 1.8–2.4)
MCH RBC QN AUTO: 28.4 PG (ref 26–34)
MCH RBC QN AUTO: 28.7 PG (ref 26–34)
MCH RBC QN AUTO: 29.7 PG (ref 26–34)
MCH RBC QN AUTO: 29.8 PG (ref 26–34)
MCH RBC QN AUTO: 30 PG (ref 26–34)
MCH RBC QN AUTO: 30.4 PG (ref 26–34)
MCH RBC QN AUTO: 30.6 PG (ref 26–34)
MCHC RBC AUTO-ENTMCNC: 32.3 G/DL (ref 31–36)
MCHC RBC AUTO-ENTMCNC: 32.6 G/DL (ref 31–36)
MCHC RBC AUTO-ENTMCNC: 32.7 G/DL (ref 31–36)
MCHC RBC AUTO-ENTMCNC: 32.9 G/DL (ref 31–36)
MCHC RBC AUTO-ENTMCNC: 33 G/DL (ref 31–36)
MCHC RBC AUTO-ENTMCNC: 33.6 G/DL (ref 31–36)
MCHC RBC AUTO-ENTMCNC: 34.2 G/DL (ref 31–36)
MCV RBC AUTO: 87.1 FL (ref 80–100)
MCV RBC AUTO: 88.8 FL (ref 80–100)
MCV RBC AUTO: 89.4 FL (ref 80–100)
MCV RBC AUTO: 90.3 FL (ref 80–100)
MCV RBC AUTO: 90.6 FL (ref 80–100)
MCV RBC AUTO: 91.1 FL (ref 80–100)
MCV RBC AUTO: 91.1 FL (ref 80–100)
METHEMOGLOBIN VENOUS: 0.5 %
MICROSCOPIC EXAMINATION: ABNORMAL
MICROSCOPIC EXAMINATION: YES
MONOCYTES ABSOLUTE: 0.1 K/UL (ref 0–1.3)
MONOCYTES ABSOLUTE: 0.1 K/UL (ref 0–1.3)
MONOCYTES ABSOLUTE: 1 K/UL (ref 0–1.3)
MONOCYTES ABSOLUTE: 1.1 K/UL (ref 0–1.3)
MONOCYTES ABSOLUTE: 1.2 K/UL (ref 0–1.3)
MONOCYTES ABSOLUTE: 1.4 K/UL (ref 0–1.3)
MONOCYTES RELATIVE PERCENT: 1 %
MONOCYTES RELATIVE PERCENT: 1.2 %
MONOCYTES RELATIVE PERCENT: 2 %
MONOCYTES RELATIVE PERCENT: 6 %
MONOCYTES RELATIVE PERCENT: 7.2 %
MONOCYTES RELATIVE PERCENT: 9.2 %
MONONUCLEAR UNIDENTIFIED CELLS FLUID: 4 %
NEUTROPHIL, FLUID: 22 %
NEUTROPHILS ABSOLUTE: 12.6 K/UL (ref 1.7–7.7)
NEUTROPHILS ABSOLUTE: 14.3 K/UL (ref 1.7–7.7)
NEUTROPHILS ABSOLUTE: 15.5 K/UL (ref 1.7–7.7)
NEUTROPHILS ABSOLUTE: 68.4 K/UL (ref 1.7–7.7)
NEUTROPHILS ABSOLUTE: 7.5 K/UL (ref 1.7–7.7)
NEUTROPHILS ABSOLUTE: 9.1 K/UL (ref 1.7–7.7)
NEUTROPHILS RELATIVE PERCENT: 45 %
NEUTROPHILS RELATIVE PERCENT: 81.1 %
NEUTROPHILS RELATIVE PERCENT: 83 %
NEUTROPHILS RELATIVE PERCENT: 85.3 %
NEUTROPHILS RELATIVE PERCENT: 87.5 %
NEUTROPHILS RELATIVE PERCENT: 95.9 %
NITRITE, URINE: NEGATIVE
NITRITE, URINE: NEGATIVE
NUCLEATED CELLS FLUID: 296 /CUMM
NUMBER OF CELLS COUNTED FLUID: 100
O2 CONTENT, VEN: 9 VOL %
O2 SAT, VEN: 70 %
O2 THERAPY: ABNORMAL
OCCULT BLOOD SCREENING: ABNORMAL
ORGANISM: ABNORMAL
OVALOCYTES: ABNORMAL
PATH CONSULT FLUID: NORMAL
PCO2, VEN: 34.7 MMHG (ref 40–50)
PDW BLD-RTO: 16.4 % (ref 12.4–15.4)
PDW BLD-RTO: 16.6 % (ref 12.4–15.4)
PDW BLD-RTO: 17.2 % (ref 12.4–15.4)
PDW BLD-RTO: 17.3 % (ref 12.4–15.4)
PDW BLD-RTO: 17.3 % (ref 12.4–15.4)
PDW BLD-RTO: 17.5 % (ref 12.4–15.4)
PDW BLD-RTO: 18 % (ref 12.4–15.4)
PERFORMED ON: NORMAL
PH UA: 6
PH UA: 7
PH VENOUS: 7.37 (ref 7.35–7.45)
PH, BODY FLUID: 7.8
PHOSPHORUS: 1.9 MG/DL (ref 2.5–4.9)
PLATELET # BLD: 129 K/UL (ref 135–450)
PLATELET # BLD: 141 K/UL (ref 135–450)
PLATELET # BLD: 143 K/UL (ref 135–450)
PLATELET # BLD: 182 K/UL (ref 135–450)
PLATELET # BLD: 203 K/UL (ref 135–450)
PLATELET # BLD: 205 K/UL (ref 135–450)
PLATELET # BLD: 82 K/UL (ref 135–450)
PLATELET SLIDE REVIEW: ABNORMAL
PLATELET SLIDE REVIEW: ADEQUATE
PMV BLD AUTO: 6.4 FL (ref 5–10.5)
PMV BLD AUTO: 6.5 FL (ref 5–10.5)
PMV BLD AUTO: 6.6 FL (ref 5–10.5)
PMV BLD AUTO: 6.8 FL (ref 5–10.5)
PMV BLD AUTO: 7.1 FL (ref 5–10.5)
PMV BLD AUTO: 7.2 FL (ref 5–10.5)
PMV BLD AUTO: 7.7 FL (ref 5–10.5)
PO2, VEN: 41.3 MMHG (ref 25–40)
POTASSIUM REFLEX MAGNESIUM: 3.7 MMOL/L (ref 3.5–5.1)
POTASSIUM REFLEX MAGNESIUM: 3.8 MMOL/L (ref 3.5–5.1)
POTASSIUM REFLEX MAGNESIUM: 3.9 MMOL/L (ref 3.5–5.1)
POTASSIUM REFLEX MAGNESIUM: 4 MMOL/L (ref 3.5–5.1)
POTASSIUM REFLEX MAGNESIUM: 4.1 MMOL/L (ref 3.5–5.1)
POTASSIUM REFLEX MAGNESIUM: 4.3 MMOL/L (ref 3.5–5.1)
POTASSIUM SERPL-SCNC: 4 MMOL/L (ref 3.5–5.1)
POTASSIUM SERPL-SCNC: 4.2 MMOL/L (ref 3.5–5.1)
POTASSIUM SERPL-SCNC: 5 MMOL/L (ref 3.5–5.1)
PRO-BNP: 1922 PG/ML (ref 0–124)
PROCALCITONIN: 49.09 NG/ML (ref 0–0.15)
PROTEIN FLUID: 2.8 G/DL
PROTEIN UA: NEGATIVE MG/DL
PROTEIN UA: NEGATIVE MG/DL
PROTHROMBIN TIME: 13.4 SEC (ref 9.8–13)
PROTHROMBIN TIME: 14.9 SEC (ref 9.8–13)
PROTHROMBIN TIME: 17.1 SEC (ref 9.8–13)
RBC # BLD: 2.31 M/UL (ref 4.2–5.9)
RBC # BLD: 2.68 M/UL (ref 4.2–5.9)
RBC # BLD: 2.73 M/UL (ref 4.2–5.9)
RBC # BLD: 2.93 M/UL (ref 4.2–5.9)
RBC # BLD: 2.93 M/UL (ref 4.2–5.9)
RBC # BLD: 3.25 M/UL (ref 4.2–5.9)
RBC # BLD: 3.42 M/UL (ref 4.2–5.9)
RBC FLUID: 7604 /CUMM
RBC UA: ABNORMAL /HPF (ref 0–2)
REPORT: NORMAL
SCHISTOCYTES: ABNORMAL
SLIDE REVIEW: ABNORMAL
SLIDE REVIEW: ABNORMAL
SODIUM BLD-SCNC: 133 MMOL/L (ref 136–145)
SODIUM BLD-SCNC: 134 MMOL/L (ref 136–145)
SODIUM BLD-SCNC: 134 MMOL/L (ref 136–145)
SODIUM BLD-SCNC: 135 MMOL/L (ref 136–145)
SODIUM BLD-SCNC: 137 MMOL/L (ref 136–145)
SODIUM BLD-SCNC: 138 MMOL/L (ref 136–145)
SODIUM BLD-SCNC: 138 MMOL/L (ref 136–145)
SODIUM BLD-SCNC: 139 MMOL/L (ref 136–145)
SODIUM BLD-SCNC: 141 MMOL/L (ref 136–145)
SPECIFIC GRAVITY UA: 1.01
SPECIFIC GRAVITY UA: 1.01
TARGET CELLS: ABNORMAL
TCO2 CALC VENOUS: 21 MMOL/L
TOTAL PROTEIN: 5.2 G/DL (ref 6.4–8.2)
TOTAL PROTEIN: 5.8 G/DL (ref 6.4–8.2)
TOTAL PROTEIN: 6.6 G/DL (ref 6.4–8.2)
TOTAL PROTEIN: 6.8 G/DL (ref 6.4–8.2)
TOXIC GRANULATION: PRESENT
TROPONIN: <0.01 NG/ML
URINE TYPE: ABNORMAL
URINE TYPE: ABNORMAL
UROBILINOGEN, URINE: 0.2 E.U./DL
UROBILINOGEN, URINE: 2 E.U./DL
VACUOLATED NEUTROPHILS: PRESENT
VANCOMYCIN RANDOM: 11.3 UG/ML
WBC # BLD: 11.2 K/UL (ref 4–11)
WBC # BLD: 11.9 K/UL (ref 4–11)
WBC # BLD: 13.3 K/UL (ref 4–11)
WBC # BLD: 16.7 K/UL (ref 4–11)
WBC # BLD: 17.7 K/UL (ref 4–11)
WBC # BLD: 7.8 K/UL (ref 4–11)
WBC # BLD: 71.2 K/UL (ref 4–11)
WBC UA: ABNORMAL /HPF (ref 0–5)

## 2018-01-01 PROCEDURE — 2580000003 HC RX 258: Performed by: SURGERY

## 2018-01-01 PROCEDURE — 99024 POSTOP FOLLOW-UP VISIT: CPT | Performed by: SURGERY

## 2018-01-01 PROCEDURE — 80048 BASIC METABOLIC PNL TOTAL CA: CPT

## 2018-01-01 PROCEDURE — 6360000002 HC RX W HCPCS: Performed by: EMERGENCY MEDICINE

## 2018-01-01 PROCEDURE — C9113 INJ PANTOPRAZOLE SODIUM, VIA: HCPCS | Performed by: SURGERY

## 2018-01-01 PROCEDURE — 80202 ASSAY OF VANCOMYCIN: CPT

## 2018-01-01 PROCEDURE — 94770 HC ETCO2 MONITOR DAILY: CPT

## 2018-01-01 PROCEDURE — 89051 BODY FLUID CELL COUNT: CPT

## 2018-01-01 PROCEDURE — P9045 ALBUMIN (HUMAN), 5%, 250 ML: HCPCS | Performed by: NURSE ANESTHETIST, CERTIFIED REGISTERED

## 2018-01-01 PROCEDURE — 6360000002 HC RX W HCPCS: Performed by: SURGERY

## 2018-01-01 PROCEDURE — 74022 RADEX COMPL AQT ABD SERIES: CPT

## 2018-01-01 PROCEDURE — 87205 SMEAR GRAM STAIN: CPT

## 2018-01-01 PROCEDURE — 86923 COMPATIBILITY TEST ELECTRIC: CPT

## 2018-01-01 PROCEDURE — G8988 SELF CARE GOAL STATUS: HCPCS

## 2018-01-01 PROCEDURE — 6360000002 HC RX W HCPCS: Performed by: INTERNAL MEDICINE

## 2018-01-01 PROCEDURE — 85610 PROTHROMBIN TIME: CPT

## 2018-01-01 PROCEDURE — C9113 INJ PANTOPRAZOLE SODIUM, VIA: HCPCS | Performed by: INTERNAL MEDICINE

## 2018-01-01 PROCEDURE — 82803 BLOOD GASES ANY COMBINATION: CPT

## 2018-01-01 PROCEDURE — 94761 N-INVAS EAR/PLS OXIMETRY MLT: CPT

## 2018-01-01 PROCEDURE — 87070 CULTURE OTHR SPECIMN AEROBIC: CPT

## 2018-01-01 PROCEDURE — 83605 ASSAY OF LACTIC ACID: CPT

## 2018-01-01 PROCEDURE — 2700000000 HC OXYGEN THERAPY PER DAY

## 2018-01-01 PROCEDURE — 99285 EMERGENCY DEPT VISIT HI MDM: CPT

## 2018-01-01 PROCEDURE — 85730 THROMBOPLASTIN TIME PARTIAL: CPT

## 2018-01-01 PROCEDURE — 80053 COMPREHEN METABOLIC PANEL: CPT

## 2018-01-01 PROCEDURE — 83986 ASSAY PH BODY FLUID NOS: CPT

## 2018-01-01 PROCEDURE — 71045 X-RAY EXAM CHEST 1 VIEW: CPT

## 2018-01-01 PROCEDURE — 2580000003 HC RX 258: Performed by: FAMILY MEDICINE

## 2018-01-01 PROCEDURE — 99222 1ST HOSP IP/OBS MODERATE 55: CPT | Performed by: SURGERY

## 2018-01-01 PROCEDURE — 97530 THERAPEUTIC ACTIVITIES: CPT

## 2018-01-01 PROCEDURE — 74018 RADEX ABDOMEN 1 VIEW: CPT

## 2018-01-01 PROCEDURE — 2709999900 HC NON-CHARGEABLE SUPPLY: Performed by: SURGERY

## 2018-01-01 PROCEDURE — 86900 BLOOD TYPING SEROLOGIC ABO: CPT

## 2018-01-01 PROCEDURE — 1200000000 HC SEMI PRIVATE

## 2018-01-01 PROCEDURE — 70450 CT HEAD/BRAIN W/O DYE: CPT

## 2018-01-01 PROCEDURE — 2500000003 HC RX 250 WO HCPCS: Performed by: INTERNAL MEDICINE

## 2018-01-01 PROCEDURE — 85025 COMPLETE CBC W/AUTO DIFF WBC: CPT

## 2018-01-01 PROCEDURE — 99232 SBSQ HOSP IP/OBS MODERATE 35: CPT | Performed by: SURGERY

## 2018-01-01 PROCEDURE — 0W9B30Z DRAINAGE OF LEFT PLEURAL CAVITY WITH DRAINAGE DEVICE, PERCUTANEOUS APPROACH: ICD-10-PCS | Performed by: INTERNAL MEDICINE

## 2018-01-01 PROCEDURE — 6370000000 HC RX 637 (ALT 250 FOR IP): Performed by: INTERNAL MEDICINE

## 2018-01-01 PROCEDURE — 93971 EXTREMITY STUDY: CPT

## 2018-01-01 PROCEDURE — 93010 ELECTROCARDIOGRAM REPORT: CPT | Performed by: INTERNAL MEDICINE

## 2018-01-01 PROCEDURE — 87116 MYCOBACTERIA CULTURE: CPT

## 2018-01-01 PROCEDURE — 2580000003 HC RX 258: Performed by: INTERNAL MEDICINE

## 2018-01-01 PROCEDURE — 2720000010 HC SURG SUPPLY STERILE: Performed by: SURGERY

## 2018-01-01 PROCEDURE — 44320 COLOSTOMY: CPT | Performed by: SURGERY

## 2018-01-01 PROCEDURE — 85014 HEMATOCRIT: CPT

## 2018-01-01 PROCEDURE — G8978 MOBILITY CURRENT STATUS: HCPCS

## 2018-01-01 PROCEDURE — C9113 INJ PANTOPRAZOLE SODIUM, VIA: HCPCS | Performed by: EMERGENCY MEDICINE

## 2018-01-01 PROCEDURE — 2000000000 HC ICU R&B

## 2018-01-01 PROCEDURE — 96375 TX/PRO/DX INJ NEW DRUG ADDON: CPT

## 2018-01-01 PROCEDURE — G8987 SELF CARE CURRENT STATUS: HCPCS

## 2018-01-01 PROCEDURE — 85027 COMPLETE CBC AUTOMATED: CPT

## 2018-01-01 PROCEDURE — 94664 DEMO&/EVAL PT USE INHALER: CPT

## 2018-01-01 PROCEDURE — APPSS30 APP SPLIT SHARED TIME 16-30 MINUTES: Performed by: CLINICAL NURSE SPECIALIST

## 2018-01-01 PROCEDURE — 97116 GAIT TRAINING THERAPY: CPT

## 2018-01-01 PROCEDURE — 86850 RBC ANTIBODY SCREEN: CPT

## 2018-01-01 PROCEDURE — 51702 INSERT TEMP BLADDER CATH: CPT

## 2018-01-01 PROCEDURE — 87186 SC STD MICRODIL/AGAR DIL: CPT

## 2018-01-01 PROCEDURE — G8989 SELF CARE D/C STATUS: HCPCS

## 2018-01-01 PROCEDURE — 87015 SPECIMEN INFECT AGNT CONCNTJ: CPT

## 2018-01-01 PROCEDURE — 6360000002 HC RX W HCPCS

## 2018-01-01 PROCEDURE — 6360000002 HC RX W HCPCS: Performed by: NURSE ANESTHETIST, CERTIFIED REGISTERED

## 2018-01-01 PROCEDURE — P9047 ALBUMIN (HUMAN), 25%, 50ML: HCPCS | Performed by: INTERNAL MEDICINE

## 2018-01-01 PROCEDURE — 36415 COLL VENOUS BLD VENIPUNCTURE: CPT

## 2018-01-01 PROCEDURE — 2580000003 HC RX 258: Performed by: EMERGENCY MEDICINE

## 2018-01-01 PROCEDURE — 83880 ASSAY OF NATRIURETIC PEPTIDE: CPT

## 2018-01-01 PROCEDURE — 6360000002 HC RX W HCPCS: Performed by: FAMILY MEDICINE

## 2018-01-01 PROCEDURE — 2580000003 HC RX 258

## 2018-01-01 PROCEDURE — 99291 CRITICAL CARE FIRST HOUR: CPT | Performed by: INTERNAL MEDICINE

## 2018-01-01 PROCEDURE — 86901 BLOOD TYPING SEROLOGIC RH(D): CPT

## 2018-01-01 PROCEDURE — 0W993ZZ DRAINAGE OF RIGHT PLEURAL CAVITY, PERCUTANEOUS APPROACH: ICD-10-PCS | Performed by: INTERNAL MEDICINE

## 2018-01-01 PROCEDURE — 51798 US URINE CAPACITY MEASURE: CPT

## 2018-01-01 PROCEDURE — 36591 DRAW BLOOD OFF VENOUS DEVICE: CPT

## 2018-01-01 PROCEDURE — 3600000004 HC SURGERY LEVEL 4 BASE: Performed by: SURGERY

## 2018-01-01 PROCEDURE — 6360000004 HC RX CONTRAST MEDICATION: Performed by: EMERGENCY MEDICINE

## 2018-01-01 PROCEDURE — 84100 ASSAY OF PHOSPHORUS: CPT

## 2018-01-01 PROCEDURE — 87801 DETECT AGNT MULT DNA AMPLI: CPT

## 2018-01-01 PROCEDURE — 3600000014 HC SURGERY LEVEL 4 ADDTL 15MIN: Performed by: SURGERY

## 2018-01-01 PROCEDURE — 6370000000 HC RX 637 (ALT 250 FOR IP): Performed by: EMERGENCY MEDICINE

## 2018-01-01 PROCEDURE — 6370000000 HC RX 637 (ALT 250 FOR IP): Performed by: SURGERY

## 2018-01-01 PROCEDURE — 88307 TISSUE EXAM BY PATHOLOGIST: CPT

## 2018-01-01 PROCEDURE — P9016 RBC LEUKOCYTES REDUCED: HCPCS

## 2018-01-01 PROCEDURE — 2580000003 HC RX 258: Performed by: HOSPITALIST

## 2018-01-01 PROCEDURE — 83735 ASSAY OF MAGNESIUM: CPT

## 2018-01-01 PROCEDURE — 6360000002 HC RX W HCPCS: Performed by: ANESTHESIOLOGY

## 2018-01-01 PROCEDURE — 96365 THER/PROPH/DIAG IV INF INIT: CPT

## 2018-01-01 PROCEDURE — 97162 PT EVAL MOD COMPLEX 30 MIN: CPT

## 2018-01-01 PROCEDURE — 87206 SMEAR FLUORESCENT/ACID STAI: CPT

## 2018-01-01 PROCEDURE — 82945 GLUCOSE OTHER FLUID: CPT

## 2018-01-01 PROCEDURE — 96376 TX/PRO/DX INJ SAME DRUG ADON: CPT

## 2018-01-01 PROCEDURE — 93005 ELECTROCARDIOGRAM TRACING: CPT | Performed by: EMERGENCY MEDICINE

## 2018-01-01 PROCEDURE — 84484 ASSAY OF TROPONIN QUANT: CPT

## 2018-01-01 PROCEDURE — 2580000003 HC RX 258: Performed by: NURSE ANESTHETIST, CERTIFIED REGISTERED

## 2018-01-01 PROCEDURE — 84157 ASSAY OF PROTEIN OTHER: CPT

## 2018-01-01 PROCEDURE — 3700000000 HC ANESTHESIA ATTENDED CARE: Performed by: SURGERY

## 2018-01-01 PROCEDURE — 2500000003 HC RX 250 WO HCPCS: Performed by: EMERGENCY MEDICINE

## 2018-01-01 PROCEDURE — 81003 URINALYSIS AUTO W/O SCOPE: CPT

## 2018-01-01 PROCEDURE — 97110 THERAPEUTIC EXERCISES: CPT

## 2018-01-01 PROCEDURE — APPNB30 APP NON BILLABLE TIME 0-30 MINS: Performed by: CLINICAL NURSE SPECIALIST

## 2018-01-01 PROCEDURE — 6360000002 HC RX W HCPCS: Performed by: HOSPITALIST

## 2018-01-01 PROCEDURE — 85018 HEMOGLOBIN: CPT

## 2018-01-01 PROCEDURE — 0DBW0ZX EXCISION OF PERITONEUM, OPEN APPROACH, DIAGNOSTIC: ICD-10-PCS | Performed by: SURGERY

## 2018-01-01 PROCEDURE — 84145 PROCALCITONIN (PCT): CPT

## 2018-01-01 PROCEDURE — 83615 LACTATE (LD) (LDH) ENZYME: CPT

## 2018-01-01 PROCEDURE — 2500000003 HC RX 250 WO HCPCS: Performed by: NURSE ANESTHETIST, CERTIFIED REGISTERED

## 2018-01-01 PROCEDURE — 87102 FUNGUS ISOLATION CULTURE: CPT

## 2018-01-01 PROCEDURE — 2500000003 HC RX 250 WO HCPCS: Performed by: SURGERY

## 2018-01-01 PROCEDURE — 6360000002 HC RX W HCPCS: Performed by: NURSE PRACTITIONER

## 2018-01-01 PROCEDURE — 7100000001 HC PACU RECOVERY - ADDTL 15 MIN: Performed by: SURGERY

## 2018-01-01 PROCEDURE — 74177 CT ABD & PELVIS W/CONTRAST: CPT

## 2018-01-01 PROCEDURE — 86301 IMMUNOASSAY TUMOR CA 19-9: CPT

## 2018-01-01 PROCEDURE — 7100000000 HC PACU RECOVERY - FIRST 15 MIN: Performed by: SURGERY

## 2018-01-01 PROCEDURE — 6370000000 HC RX 637 (ALT 250 FOR IP): Performed by: NURSE PRACTITIONER

## 2018-01-01 PROCEDURE — G0328 FECAL BLOOD SCRN IMMUNOASSAY: HCPCS

## 2018-01-01 PROCEDURE — 71250 CT THORAX DX C-: CPT

## 2018-01-01 PROCEDURE — 32557 INSERT CATH PLEURA W/ IMAGE: CPT | Performed by: INTERNAL MEDICINE

## 2018-01-01 PROCEDURE — 3700000001 HC ADD 15 MINUTES (ANESTHESIA): Performed by: SURGERY

## 2018-01-01 PROCEDURE — 49180 BIOPSY ABDOMINAL MASS: CPT | Performed by: SURGERY

## 2018-01-01 PROCEDURE — 36430 TRANSFUSION BLD/BLD COMPNT: CPT

## 2018-01-01 PROCEDURE — 0D1M0Z4 BYPASS DESCENDING COLON TO CUTANEOUS, OPEN APPROACH: ICD-10-PCS | Performed by: SURGERY

## 2018-01-01 PROCEDURE — 97165 OT EVAL LOW COMPLEX 30 MIN: CPT

## 2018-01-01 PROCEDURE — 82140 ASSAY OF AMMONIA: CPT

## 2018-01-01 PROCEDURE — APPNB60 APP NON BILLABLE TIME 46-60 MINS: Performed by: CLINICAL NURSE SPECIALIST

## 2018-01-01 PROCEDURE — G8979 MOBILITY GOAL STATUS: HCPCS

## 2018-01-01 PROCEDURE — 81001 URINALYSIS AUTO W/SCOPE: CPT

## 2018-01-01 PROCEDURE — 87040 BLOOD CULTURE FOR BACTERIA: CPT

## 2018-01-01 PROCEDURE — 96361 HYDRATE IV INFUSION ADD-ON: CPT

## 2018-01-01 RX ORDER — DEXTROSE MONOHYDRATE 25 G/50ML
25 INJECTION, SOLUTION INTRAVENOUS PRN
Status: DISCONTINUED | OUTPATIENT
Start: 2018-01-01 | End: 2018-01-01 | Stop reason: HOSPADM

## 2018-01-01 RX ORDER — MORPHINE SULFATE 100 MG/5ML
10 SOLUTION ORAL
Status: DISCONTINUED | OUTPATIENT
Start: 2018-01-01 | End: 2018-01-01 | Stop reason: HOSPADM

## 2018-01-01 RX ORDER — SODIUM CHLORIDE 0.9 % (FLUSH) 0.9 %
10 SYRINGE (ML) INJECTION EVERY 12 HOURS SCHEDULED
Status: DISCONTINUED | OUTPATIENT
Start: 2018-01-01 | End: 2018-01-01 | Stop reason: SDUPTHER

## 2018-01-01 RX ORDER — SODIUM CHLORIDE 9 MG/ML
INJECTION, SOLUTION INTRAVENOUS
Status: DISPENSED
Start: 2018-01-01 | End: 2018-01-01

## 2018-01-01 RX ORDER — NALOXONE HYDROCHLORIDE 0.4 MG/ML
0.4 INJECTION, SOLUTION INTRAMUSCULAR; INTRAVENOUS; SUBCUTANEOUS PRN
Status: DISCONTINUED | OUTPATIENT
Start: 2018-01-01 | End: 2018-01-01

## 2018-01-01 RX ORDER — MEGESTROL ACETATE 20 MG/1
20 TABLET ORAL DAILY
Status: ON HOLD | COMMUNITY
End: 2018-01-01 | Stop reason: HOSPADM

## 2018-01-01 RX ORDER — LIDOCAINE HYDROCHLORIDE 20 MG/ML
INJECTION, SOLUTION INFILTRATION; PERINEURAL PRN
Status: DISCONTINUED | OUTPATIENT
Start: 2018-01-01 | End: 2018-01-01 | Stop reason: SDUPTHER

## 2018-01-01 RX ORDER — 0.9 % SODIUM CHLORIDE 0.9 %
1000 INTRAVENOUS SOLUTION INTRAVENOUS ONCE
Status: COMPLETED | OUTPATIENT
Start: 2018-01-01 | End: 2018-01-01

## 2018-01-01 RX ORDER — SODIUM CHLORIDE, SODIUM LACTATE, POTASSIUM CHLORIDE, CALCIUM CHLORIDE 600; 310; 30; 20 MG/100ML; MG/100ML; MG/100ML; MG/100ML
INJECTION, SOLUTION INTRAVENOUS CONTINUOUS
Status: DISCONTINUED | OUTPATIENT
Start: 2018-01-01 | End: 2018-01-01

## 2018-01-01 RX ORDER — DIPHENHYDRAMINE HYDROCHLORIDE 50 MG/ML
25 INJECTION INTRAMUSCULAR; INTRAVENOUS ONCE
Status: COMPLETED | OUTPATIENT
Start: 2018-01-01 | End: 2018-01-01

## 2018-01-01 RX ORDER — MAGNESIUM HYDROXIDE 1200 MG/15ML
LIQUID ORAL CONTINUOUS PRN
Status: DISCONTINUED | OUTPATIENT
Start: 2018-01-01 | End: 2018-01-01 | Stop reason: HOSPADM

## 2018-01-01 RX ORDER — OXYCODONE HYDROCHLORIDE 5 MG/1
5 TABLET ORAL EVERY 4 HOURS PRN
Status: ON HOLD | COMMUNITY
End: 2018-01-01 | Stop reason: HOSPADM

## 2018-01-01 RX ORDER — ONDANSETRON 2 MG/ML
4 INJECTION INTRAMUSCULAR; INTRAVENOUS EVERY 6 HOURS PRN
Status: DISCONTINUED | OUTPATIENT
Start: 2018-01-01 | End: 2018-01-01 | Stop reason: HOSPADM

## 2018-01-01 RX ORDER — ALBUMIN, HUMAN INJ 5% 5 %
SOLUTION INTRAVENOUS PRN
Status: DISCONTINUED | OUTPATIENT
Start: 2018-01-01 | End: 2018-01-01 | Stop reason: SDUPTHER

## 2018-01-01 RX ORDER — SODIUM CHLORIDE 0.9 % (FLUSH) 0.9 %
10 SYRINGE (ML) INJECTION EVERY 12 HOURS SCHEDULED
Status: CANCELLED | OUTPATIENT
Start: 2018-01-01

## 2018-01-01 RX ORDER — SODIUM CHLORIDE 0.9 % (FLUSH) 0.9 %
10 SYRINGE (ML) INJECTION PRN
Status: DISCONTINUED | OUTPATIENT
Start: 2018-01-01 | End: 2018-01-01 | Stop reason: SDUPTHER

## 2018-01-01 RX ORDER — BUPIVACAINE HYDROCHLORIDE AND EPINEPHRINE 5; 5 MG/ML; UG/ML
INJECTION, SOLUTION PERINEURAL PRN
Status: DISCONTINUED | OUTPATIENT
Start: 2018-01-01 | End: 2018-01-01 | Stop reason: HOSPADM

## 2018-01-01 RX ORDER — TAMSULOSIN HYDROCHLORIDE 0.4 MG/1
0.4 CAPSULE ORAL DAILY
Status: DISCONTINUED | OUTPATIENT
Start: 2018-01-01 | End: 2018-01-01 | Stop reason: HOSPADM

## 2018-01-01 RX ORDER — SODIUM CHLORIDE 0.9 % (FLUSH) 0.9 %
10 SYRINGE (ML) INJECTION EVERY 12 HOURS SCHEDULED
Status: DISCONTINUED | OUTPATIENT
Start: 2018-01-01 | End: 2018-01-01 | Stop reason: HOSPADM

## 2018-01-01 RX ORDER — FENTANYL CITRATE 50 UG/ML
INJECTION, SOLUTION INTRAMUSCULAR; INTRAVENOUS PRN
Status: DISCONTINUED | OUTPATIENT
Start: 2018-01-01 | End: 2018-01-01 | Stop reason: SDUPTHER

## 2018-01-01 RX ORDER — SODIUM CHLORIDE, SODIUM LACTATE, POTASSIUM CHLORIDE, CALCIUM CHLORIDE 600; 310; 30; 20 MG/100ML; MG/100ML; MG/100ML; MG/100ML
INJECTION, SOLUTION INTRAVENOUS CONTINUOUS PRN
Status: DISCONTINUED | OUTPATIENT
Start: 2018-01-01 | End: 2018-01-01 | Stop reason: SDUPTHER

## 2018-01-01 RX ORDER — HYDRALAZINE HYDROCHLORIDE 20 MG/ML
5 INJECTION INTRAMUSCULAR; INTRAVENOUS EVERY 30 MIN PRN
Status: DISCONTINUED | OUTPATIENT
Start: 2018-01-01 | End: 2018-01-01 | Stop reason: HOSPADM

## 2018-01-01 RX ORDER — ROCURONIUM BROMIDE 10 MG/ML
INJECTION, SOLUTION INTRAVENOUS PRN
Status: DISCONTINUED | OUTPATIENT
Start: 2018-01-01 | End: 2018-01-01 | Stop reason: SDUPTHER

## 2018-01-01 RX ORDER — SODIUM CHLORIDE 9 MG/ML
INJECTION, SOLUTION INTRAVENOUS
Status: COMPLETED
Start: 2018-01-01 | End: 2018-01-01

## 2018-01-01 RX ORDER — ONDANSETRON 2 MG/ML
INJECTION INTRAMUSCULAR; INTRAVENOUS
Status: COMPLETED
Start: 2018-01-01 | End: 2018-01-01

## 2018-01-01 RX ORDER — MORPHINE SULFATE 100 MG/5ML
5 SOLUTION ORAL
Qty: 30 ML | Refills: 0 | Status: SHIPPED | OUTPATIENT
Start: 2018-01-01 | End: 2018-08-25

## 2018-01-01 RX ORDER — ACETAMINOPHEN 325 MG/1
650 TABLET ORAL ONCE
Status: COMPLETED | OUTPATIENT
Start: 2018-01-01 | End: 2018-01-01

## 2018-01-01 RX ORDER — DEXTROSE MONOHYDRATE 25 G/50ML
INJECTION, SOLUTION INTRAVENOUS
Status: COMPLETED
Start: 2018-01-01 | End: 2018-01-01

## 2018-01-01 RX ORDER — MORPHINE SULFATE 4 MG/ML
4 INJECTION, SOLUTION INTRAMUSCULAR; INTRAVENOUS
Status: DISCONTINUED | OUTPATIENT
Start: 2018-01-01 | End: 2018-01-01

## 2018-01-01 RX ORDER — DEXAMETHASONE SODIUM PHOSPHATE 10 MG/ML
INJECTION INTRAMUSCULAR; INTRAVENOUS PRN
Status: DISCONTINUED | OUTPATIENT
Start: 2018-01-01 | End: 2018-01-01 | Stop reason: SDUPTHER

## 2018-01-01 RX ORDER — PANTOPRAZOLE SODIUM 40 MG/10ML
40 INJECTION, POWDER, LYOPHILIZED, FOR SOLUTION INTRAVENOUS 2 TIMES DAILY
Status: DISCONTINUED | OUTPATIENT
Start: 2018-01-01 | End: 2018-01-01 | Stop reason: HOSPADM

## 2018-01-01 RX ORDER — LACTOBACILLUS RHAMNOSUS GG 10B CELL
1 CAPSULE ORAL 2 TIMES DAILY
Status: DISCONTINUED | OUTPATIENT
Start: 2018-01-01 | End: 2018-01-01

## 2018-01-01 RX ORDER — PANTOPRAZOLE SODIUM 40 MG/10ML
40 INJECTION, POWDER, LYOPHILIZED, FOR SOLUTION INTRAVENOUS 2 TIMES DAILY
Status: DISCONTINUED | OUTPATIENT
Start: 2018-01-01 | End: 2018-01-01

## 2018-01-01 RX ORDER — CALCIUM CARBONATE 500(1250)
500 TABLET ORAL 2 TIMES DAILY
Status: DISCONTINUED | OUTPATIENT
Start: 2018-01-01 | End: 2018-01-01

## 2018-01-01 RX ORDER — LABETALOL HYDROCHLORIDE 5 MG/ML
5 INJECTION, SOLUTION INTRAVENOUS
Status: DISCONTINUED | OUTPATIENT
Start: 2018-01-01 | End: 2018-01-01 | Stop reason: HOSPADM

## 2018-01-01 RX ORDER — PROPOFOL 10 MG/ML
INJECTION, EMULSION INTRAVENOUS PRN
Status: DISCONTINUED | OUTPATIENT
Start: 2018-01-01 | End: 2018-01-01 | Stop reason: SDUPTHER

## 2018-01-01 RX ORDER — 0.9 % SODIUM CHLORIDE 0.9 %
500 INTRAVENOUS SOLUTION INTRAVENOUS ONCE
Status: COMPLETED | OUTPATIENT
Start: 2018-01-01 | End: 2018-01-01

## 2018-01-01 RX ORDER — OXYCODONE HYDROCHLORIDE 5 MG/1
5 TABLET ORAL EVERY 4 HOURS PRN
Status: DISCONTINUED | OUTPATIENT
Start: 2018-01-01 | End: 2018-01-01

## 2018-01-01 RX ORDER — MIDAZOLAM HYDROCHLORIDE 1 MG/ML
INJECTION INTRAMUSCULAR; INTRAVENOUS PRN
Status: DISCONTINUED | OUTPATIENT
Start: 2018-01-01 | End: 2018-01-01 | Stop reason: SDUPTHER

## 2018-01-01 RX ORDER — OXYCODONE HYDROCHLORIDE 5 MG/1
5 TABLET ORAL EVERY 4 HOURS PRN
Qty: 15 TABLET | Refills: 0 | Status: SHIPPED | OUTPATIENT
Start: 2018-01-01 | End: 2018-01-01

## 2018-01-01 RX ORDER — ALBUMIN (HUMAN) 12.5 G/50ML
50 SOLUTION INTRAVENOUS ONCE
Status: COMPLETED | OUTPATIENT
Start: 2018-01-01 | End: 2018-01-01

## 2018-01-01 RX ORDER — ONDANSETRON 2 MG/ML
4 INJECTION INTRAMUSCULAR; INTRAVENOUS ONCE
Status: COMPLETED | OUTPATIENT
Start: 2018-01-01 | End: 2018-01-01

## 2018-01-01 RX ORDER — DIPHENHYDRAMINE HYDROCHLORIDE 50 MG/ML
6.25 INJECTION INTRAMUSCULAR; INTRAVENOUS
Status: DISCONTINUED | OUTPATIENT
Start: 2018-01-01 | End: 2018-01-01 | Stop reason: HOSPADM

## 2018-01-01 RX ORDER — CIPROFLOXACIN 2 MG/ML
400 INJECTION, SOLUTION INTRAVENOUS ONCE
Status: COMPLETED | OUTPATIENT
Start: 2018-01-01 | End: 2018-01-01

## 2018-01-01 RX ORDER — OXYCODONE HYDROCHLORIDE 5 MG/1
5 TABLET ORAL EVERY 4 HOURS PRN
Status: DISCONTINUED | OUTPATIENT
Start: 2018-01-01 | End: 2018-01-01 | Stop reason: HOSPADM

## 2018-01-01 RX ORDER — ONDANSETRON 2 MG/ML
4 INJECTION INTRAMUSCULAR; INTRAVENOUS EVERY 6 HOURS PRN
Status: DISCONTINUED | OUTPATIENT
Start: 2018-01-01 | End: 2018-01-01 | Stop reason: SDUPTHER

## 2018-01-01 RX ORDER — SODIUM CHLORIDE 0.9 % (FLUSH) 0.9 %
10 SYRINGE (ML) INJECTION PRN
Status: DISCONTINUED | OUTPATIENT
Start: 2018-01-01 | End: 2018-01-01 | Stop reason: HOSPADM

## 2018-01-01 RX ORDER — 0.9 % SODIUM CHLORIDE 0.9 %
1700 INTRAVENOUS SOLUTION INTRAVENOUS ONCE
Status: COMPLETED | OUTPATIENT
Start: 2018-01-01 | End: 2018-01-01

## 2018-01-01 RX ORDER — MEPERIDINE HYDROCHLORIDE 50 MG/ML
12.5 INJECTION INTRAMUSCULAR; INTRAVENOUS; SUBCUTANEOUS EVERY 5 MIN PRN
Status: DISCONTINUED | OUTPATIENT
Start: 2018-01-01 | End: 2018-01-01 | Stop reason: HOSPADM

## 2018-01-01 RX ORDER — OXYCODONE HYDROCHLORIDE 5 MG/1
10 TABLET ORAL EVERY 4 HOURS PRN
Status: DISCONTINUED | OUTPATIENT
Start: 2018-01-01 | End: 2018-01-01 | Stop reason: HOSPADM

## 2018-01-01 RX ORDER — OXYCODONE HYDROCHLORIDE AND ACETAMINOPHEN 5; 325 MG/1; MG/1
1 TABLET ORAL PRN
Status: DISCONTINUED | OUTPATIENT
Start: 2018-01-01 | End: 2018-01-01 | Stop reason: HOSPADM

## 2018-01-01 RX ORDER — FINASTERIDE 5 MG/1
5 TABLET, FILM COATED ORAL DAILY
Status: DISCONTINUED | OUTPATIENT
Start: 2018-01-01 | End: 2018-01-01

## 2018-01-01 RX ORDER — GLYCOPYRROLATE 0.2 MG/ML
INJECTION INTRAMUSCULAR; INTRAVENOUS PRN
Status: DISCONTINUED | OUTPATIENT
Start: 2018-01-01 | End: 2018-01-01 | Stop reason: SDUPTHER

## 2018-01-01 RX ORDER — MEGESTROL ACETATE 20 MG/1
20 TABLET ORAL DAILY
Status: DISCONTINUED | OUTPATIENT
Start: 2018-01-01 | End: 2018-01-01

## 2018-01-01 RX ORDER — ONDANSETRON 2 MG/ML
INJECTION INTRAMUSCULAR; INTRAVENOUS PRN
Status: DISCONTINUED | OUTPATIENT
Start: 2018-01-01 | End: 2018-01-01 | Stop reason: SDUPTHER

## 2018-01-01 RX ORDER — 0.9 % SODIUM CHLORIDE 0.9 %
250 INTRAVENOUS SOLUTION INTRAVENOUS ONCE
Status: COMPLETED | OUTPATIENT
Start: 2018-01-01 | End: 2018-01-01

## 2018-01-01 RX ORDER — FINASTERIDE 5 MG/1
5 TABLET, FILM COATED ORAL DAILY
COMMUNITY

## 2018-01-01 RX ORDER — SODIUM CHLORIDE 9 MG/ML
INJECTION, SOLUTION INTRAVENOUS CONTINUOUS
Status: DISCONTINUED | OUTPATIENT
Start: 2018-01-01 | End: 2018-01-01

## 2018-01-01 RX ORDER — SODIUM CHLORIDE 9 MG/ML
INJECTION, SOLUTION INTRAVENOUS CONTINUOUS
Status: CANCELLED | OUTPATIENT
Start: 2018-01-01

## 2018-01-01 RX ORDER — MORPHINE SULFATE 4 MG/ML
4 INJECTION, SOLUTION INTRAMUSCULAR; INTRAVENOUS
Status: DISCONTINUED | OUTPATIENT
Start: 2018-01-01 | End: 2018-01-01 | Stop reason: ALTCHOICE

## 2018-01-01 RX ORDER — OXYCODONE HYDROCHLORIDE AND ACETAMINOPHEN 5; 325 MG/1; MG/1
2 TABLET ORAL PRN
Status: DISCONTINUED | OUTPATIENT
Start: 2018-01-01 | End: 2018-01-01 | Stop reason: HOSPADM

## 2018-01-01 RX ORDER — SODIUM CHLORIDE 0.9 % (FLUSH) 0.9 %
10 SYRINGE (ML) INJECTION PRN
Status: CANCELLED | OUTPATIENT
Start: 2018-01-01

## 2018-01-01 RX ORDER — MORPHINE SULFATE 2 MG/ML
2 INJECTION, SOLUTION INTRAMUSCULAR; INTRAVENOUS
Status: DISCONTINUED | OUTPATIENT
Start: 2018-01-01 | End: 2018-01-01

## 2018-01-01 RX ORDER — TAMSULOSIN HYDROCHLORIDE 0.4 MG/1
0.4 CAPSULE ORAL DAILY
Qty: 30 CAPSULE | Refills: 0 | Status: SHIPPED | OUTPATIENT
Start: 2018-01-01

## 2018-01-01 RX ORDER — ONDANSETRON 2 MG/ML
4 INJECTION INTRAMUSCULAR; INTRAVENOUS EVERY 30 MIN PRN
Status: DISCONTINUED | OUTPATIENT
Start: 2018-01-01 | End: 2018-01-01 | Stop reason: HOSPADM

## 2018-01-01 RX ORDER — DIPHENHYDRAMINE HCL 25 MG
50 TABLET ORAL EVERY 6 HOURS PRN
Status: DISCONTINUED | OUTPATIENT
Start: 2018-01-01 | End: 2018-01-01 | Stop reason: HOSPADM

## 2018-01-01 RX ORDER — LORAZEPAM 2 MG/ML
1 CONCENTRATE ORAL EVERY 4 HOURS PRN
Qty: 30 ML | Refills: 0 | Status: SHIPPED | OUTPATIENT
Start: 2018-01-01 | End: 2018-08-25

## 2018-01-01 RX ORDER — 0.9 % SODIUM CHLORIDE 0.9 %
250 INTRAVENOUS SOLUTION INTRAVENOUS ONCE
Status: DISCONTINUED | OUTPATIENT
Start: 2018-01-01 | End: 2018-01-01

## 2018-01-01 RX ORDER — PANTOPRAZOLE SODIUM 40 MG/10ML
40 INJECTION, POWDER, LYOPHILIZED, FOR SOLUTION INTRAVENOUS ONCE
Status: COMPLETED | OUTPATIENT
Start: 2018-01-01 | End: 2018-01-01

## 2018-01-01 RX ORDER — MORPHINE SULFATE 100 MG/5ML
5 SOLUTION ORAL
Status: DISCONTINUED | OUTPATIENT
Start: 2018-01-01 | End: 2018-01-01

## 2018-01-01 RX ADMIN — SODIUM CHLORIDE, POTASSIUM CHLORIDE, SODIUM LACTATE AND CALCIUM CHLORIDE: 600; 310; 30; 20 INJECTION, SOLUTION INTRAVENOUS at 00:57

## 2018-01-01 RX ADMIN — OXYCODONE HYDROCHLORIDE 5 MG: 5 TABLET ORAL at 11:53

## 2018-01-01 RX ADMIN — PANTOPRAZOLE SODIUM 40 MG: 40 INJECTION, POWDER, FOR SOLUTION INTRAVENOUS at 19:45

## 2018-01-01 RX ADMIN — SODIUM CHLORIDE, POTASSIUM CHLORIDE, SODIUM LACTATE AND CALCIUM CHLORIDE: 600; 310; 30; 20 INJECTION, SOLUTION INTRAVENOUS at 11:18

## 2018-01-01 RX ADMIN — PIPERACILLIN AND TAZOBACTAM 3.38 G: 3; .375 INJECTION, POWDER, FOR SOLUTION INTRAVENOUS at 18:41

## 2018-01-01 RX ADMIN — PANTOPRAZOLE SODIUM 40 MG: 40 INJECTION, POWDER, FOR SOLUTION INTRAVENOUS at 09:29

## 2018-01-01 RX ADMIN — ALBUMIN (HUMAN) 50 G: 0.25 INJECTION, SOLUTION INTRAVENOUS at 19:44

## 2018-01-01 RX ADMIN — MEROPENEM 1 G: 1 INJECTION, POWDER, FOR SOLUTION INTRAVENOUS at 06:58

## 2018-01-01 RX ADMIN — SUGAMMADEX 200 MG: 100 INJECTION, SOLUTION INTRAVENOUS at 11:29

## 2018-01-01 RX ADMIN — Medication 10 ML: at 08:38

## 2018-01-01 RX ADMIN — MEROPENEM 1 G: 1 INJECTION, POWDER, FOR SOLUTION INTRAVENOUS at 19:42

## 2018-01-01 RX ADMIN — SODIUM CHLORIDE 1000 ML: 9 INJECTION, SOLUTION INTRAVENOUS at 07:45

## 2018-01-01 RX ADMIN — SODIUM CHLORIDE, POTASSIUM CHLORIDE, SODIUM LACTATE AND CALCIUM CHLORIDE: 600; 310; 30; 20 INJECTION, SOLUTION INTRAVENOUS at 09:02

## 2018-01-01 RX ADMIN — Medication 10 ML: at 10:04

## 2018-01-01 RX ADMIN — SODIUM CHLORIDE, POTASSIUM CHLORIDE, SODIUM LACTATE AND CALCIUM CHLORIDE: 600; 310; 30; 20 INJECTION, SOLUTION INTRAVENOUS at 09:24

## 2018-01-01 RX ADMIN — SODIUM CHLORIDE, POTASSIUM CHLORIDE, SODIUM LACTATE AND CALCIUM CHLORIDE: 600; 310; 30; 20 INJECTION, SOLUTION INTRAVENOUS at 02:13

## 2018-01-01 RX ADMIN — ALBUMIN (HUMAN) 250 ML: 12.5 INJECTION, SOLUTION INTRAVENOUS at 10:03

## 2018-01-01 RX ADMIN — SODIUM CHLORIDE, POTASSIUM CHLORIDE, SODIUM LACTATE AND CALCIUM CHLORIDE: 600; 310; 30; 20 INJECTION, SOLUTION INTRAVENOUS at 16:53

## 2018-01-01 RX ADMIN — FENTANYL CITRATE 50 MCG: 50 INJECTION, SOLUTION INTRAMUSCULAR; INTRAVENOUS at 10:22

## 2018-01-01 RX ADMIN — Medication 10 ML: at 09:02

## 2018-01-01 RX ADMIN — SODIUM CHLORIDE: 9 INJECTION, SOLUTION INTRAVENOUS at 13:00

## 2018-01-01 RX ADMIN — Medication 10 ML: at 08:42

## 2018-01-01 RX ADMIN — Medication 0.5 MG: at 12:21

## 2018-01-01 RX ADMIN — PHENYLEPHRINE HYDROCHLORIDE 100 MCG: 10 INJECTION INTRAVENOUS at 09:35

## 2018-01-01 RX ADMIN — SODIUM CHLORIDE, POTASSIUM CHLORIDE, SODIUM LACTATE AND CALCIUM CHLORIDE: 600; 310; 30; 20 INJECTION, SOLUTION INTRAVENOUS at 01:49

## 2018-01-01 RX ADMIN — ACETAMINOPHEN 650 MG: 325 TABLET, FILM COATED ORAL at 08:23

## 2018-01-01 RX ADMIN — Medication 10 ML: at 09:00

## 2018-01-01 RX ADMIN — Medication 0.5 MG: at 12:13

## 2018-01-01 RX ADMIN — Medication 10 ML: at 19:51

## 2018-01-01 RX ADMIN — NOREPINEPHRINE BITARTRATE 2 MCG/MIN: 1 INJECTION INTRAVENOUS at 12:21

## 2018-01-01 RX ADMIN — PHENYLEPHRINE HYDROCHLORIDE 100 MCG: 10 INJECTION INTRAVENOUS at 09:54

## 2018-01-01 RX ADMIN — PANTOPRAZOLE SODIUM 40 MG: 40 INJECTION, POWDER, FOR SOLUTION INTRAVENOUS at 21:16

## 2018-01-01 RX ADMIN — FENTANYL CITRATE 100 MCG: 50 INJECTION, SOLUTION INTRAMUSCULAR; INTRAVENOUS at 09:26

## 2018-01-01 RX ADMIN — PROPOFOL 150 MG: 10 INJECTION, EMULSION INTRAVENOUS at 09:27

## 2018-01-01 RX ADMIN — Medication 1 MG: at 10:46

## 2018-01-01 RX ADMIN — ROCURONIUM BROMIDE 10 MG: 10 SOLUTION INTRAVENOUS at 10:45

## 2018-01-01 RX ADMIN — Medication 10 ML: at 21:02

## 2018-01-01 RX ADMIN — TAMSULOSIN HYDROCHLORIDE 0.4 MG: 0.4 CAPSULE ORAL at 15:10

## 2018-01-01 RX ADMIN — PANTOPRAZOLE SODIUM 40 MG: 40 INJECTION, POWDER, FOR SOLUTION INTRAVENOUS at 20:13

## 2018-01-01 RX ADMIN — MORPHINE SULFATE 2 MG: 2 INJECTION, SOLUTION INTRAMUSCULAR; INTRAVENOUS at 07:52

## 2018-01-01 RX ADMIN — SODIUM CHLORIDE 8 MG/HR: 9 INJECTION, SOLUTION INTRAVENOUS at 05:09

## 2018-01-01 RX ADMIN — DEXAMETHASONE SODIUM PHOSPHATE 10 MG: 10 INJECTION INTRAMUSCULAR; INTRAVENOUS at 09:54

## 2018-01-01 RX ADMIN — PANTOPRAZOLE SODIUM 40 MG: 40 INJECTION, POWDER, FOR SOLUTION INTRAVENOUS at 09:56

## 2018-01-01 RX ADMIN — ENOXAPARIN SODIUM 40 MG: 40 INJECTION SUBCUTANEOUS at 08:41

## 2018-01-01 RX ADMIN — Medication 10 ML: at 09:44

## 2018-01-01 RX ADMIN — PANTOPRAZOLE SODIUM 40 MG: 40 INJECTION, POWDER, FOR SOLUTION INTRAVENOUS at 14:20

## 2018-01-01 RX ADMIN — Medication 10 ML: at 09:29

## 2018-01-01 RX ADMIN — OXYCODONE HYDROCHLORIDE 5 MG: 5 TABLET ORAL at 21:16

## 2018-01-01 RX ADMIN — SODIUM CHLORIDE 1700 ML: 9 INJECTION, SOLUTION INTRAVENOUS at 09:08

## 2018-01-01 RX ADMIN — Medication 5 MG: at 09:36

## 2018-01-01 RX ADMIN — Medication 1 MG: at 13:54

## 2018-01-01 RX ADMIN — CEFAZOLIN SODIUM 1 G: 1 INJECTION, POWDER, FOR SOLUTION INTRAMUSCULAR; INTRAVENOUS at 10:37

## 2018-01-01 RX ADMIN — PANTOPRAZOLE SODIUM 40 MG: 40 INJECTION, POWDER, FOR SOLUTION INTRAVENOUS at 09:44

## 2018-01-01 RX ADMIN — PANTOPRAZOLE SODIUM 40 MG: 40 INJECTION, POWDER, FOR SOLUTION INTRAVENOUS at 19:42

## 2018-01-01 RX ADMIN — Medication 1 MG: at 15:50

## 2018-01-01 RX ADMIN — SODIUM CHLORIDE 1000 ML: 9 INJECTION, SOLUTION INTRAVENOUS at 03:38

## 2018-01-01 RX ADMIN — MORPHINE SULFATE 4 MG: 4 INJECTION, SOLUTION INTRAMUSCULAR; INTRAVENOUS at 13:20

## 2018-01-01 RX ADMIN — SODIUM CHLORIDE 8 MG/HR: 9 INJECTION, SOLUTION INTRAVENOUS at 09:35

## 2018-01-01 RX ADMIN — FENTANYL CITRATE 100 MCG: 50 INJECTION, SOLUTION INTRAMUSCULAR; INTRAVENOUS at 11:00

## 2018-01-01 RX ADMIN — Medication 10 ML: at 21:01

## 2018-01-01 RX ADMIN — Medication 10 ML: at 19:45

## 2018-01-01 RX ADMIN — Medication 10 ML: at 21:16

## 2018-01-01 RX ADMIN — LIDOCAINE HYDROCHLORIDE 3 ML: 20 INJECTION, SOLUTION INFILTRATION; PERINEURAL at 09:27

## 2018-01-01 RX ADMIN — Medication 1 MG: at 22:06

## 2018-01-01 RX ADMIN — FENTANYL CITRATE 50 MCG: 50 INJECTION, SOLUTION INTRAMUSCULAR; INTRAVENOUS at 10:20

## 2018-01-01 RX ADMIN — DIPHENHYDRAMINE HCL 50 MG: 25 TABLET ORAL at 22:37

## 2018-01-01 RX ADMIN — SODIUM CHLORIDE, POTASSIUM CHLORIDE, SODIUM LACTATE AND CALCIUM CHLORIDE: 600; 310; 30; 20 INJECTION, SOLUTION INTRAVENOUS at 01:24

## 2018-01-01 RX ADMIN — SODIUM CHLORIDE 8 MG/HR: 9 INJECTION, SOLUTION INTRAVENOUS at 20:03

## 2018-01-01 RX ADMIN — POTASSIUM PHOSPHATE, MONOBASIC AND POTASSIUM PHOSPHATE, DIBASIC 15 MMOL: 224; 236 INJECTION, SOLUTION INTRAVENOUS at 10:43

## 2018-01-01 RX ADMIN — PANTOPRAZOLE SODIUM 40 MG: 40 INJECTION, POWDER, FOR SOLUTION INTRAVENOUS at 11:22

## 2018-01-01 RX ADMIN — Medication 2 G: at 09:27

## 2018-01-01 RX ADMIN — ONDANSETRON 4 MG: 2 INJECTION INTRAMUSCULAR; INTRAVENOUS at 09:54

## 2018-01-01 RX ADMIN — Medication 10 ML: at 23:33

## 2018-01-01 RX ADMIN — PANTOPRAZOLE SODIUM 40 MG: 40 INJECTION, POWDER, FOR SOLUTION INTRAVENOUS at 20:03

## 2018-01-01 RX ADMIN — SODIUM CHLORIDE 8 MG/HR: 9 INJECTION, SOLUTION INTRAVENOUS at 03:54

## 2018-01-01 RX ADMIN — ENOXAPARIN SODIUM 40 MG: 40 INJECTION SUBCUTANEOUS at 13:26

## 2018-01-01 RX ADMIN — PIPERACILLIN SODIUM,TAZOBACTAM SODIUM 4.5 G: 4; .5 INJECTION, POWDER, FOR SOLUTION INTRAVENOUS at 13:14

## 2018-01-01 RX ADMIN — ONDANSETRON 4 MG: 2 INJECTION INTRAMUSCULAR; INTRAVENOUS at 07:46

## 2018-01-01 RX ADMIN — OXYCODONE HYDROCHLORIDE 5 MG: 5 TABLET ORAL at 11:59

## 2018-01-01 RX ADMIN — ROCURONIUM BROMIDE 10 MG: 10 SOLUTION INTRAVENOUS at 10:21

## 2018-01-01 RX ADMIN — SODIUM CHLORIDE, POTASSIUM CHLORIDE, SODIUM LACTATE AND CALCIUM CHLORIDE: 600; 310; 30; 20 INJECTION, SOLUTION INTRAVENOUS at 02:28

## 2018-01-01 RX ADMIN — ROCURONIUM BROMIDE 50 MG: 10 SOLUTION INTRAVENOUS at 09:27

## 2018-01-01 RX ADMIN — SODIUM CHLORIDE, POTASSIUM CHLORIDE, SODIUM LACTATE AND CALCIUM CHLORIDE: 600; 310; 30; 20 INJECTION, SOLUTION INTRAVENOUS at 09:45

## 2018-01-01 RX ADMIN — CIPROFLOXACIN 400 MG: 2 INJECTION, SOLUTION INTRAVENOUS at 09:27

## 2018-01-01 RX ADMIN — MIDAZOLAM HYDROCHLORIDE 2 MG: 2 INJECTION, SOLUTION INTRAMUSCULAR; INTRAVENOUS at 09:17

## 2018-01-01 RX ADMIN — PANTOPRAZOLE SODIUM 40 MG: 40 INJECTION, POWDER, FOR SOLUTION INTRAVENOUS at 20:55

## 2018-01-01 RX ADMIN — METRONIDAZOLE 500 MG: 500 INJECTION, SOLUTION INTRAVENOUS at 08:50

## 2018-01-01 RX ADMIN — Medication 5 MG: at 04:44

## 2018-01-01 RX ADMIN — Medication 10 ML: at 20:55

## 2018-01-01 RX ADMIN — CEFAZOLIN SODIUM 1 G: 1 INJECTION, POWDER, FOR SOLUTION INTRAMUSCULAR; INTRAVENOUS at 17:22

## 2018-01-01 RX ADMIN — Medication 5 MG: at 15:51

## 2018-01-01 RX ADMIN — Medication 10 ML: at 20:13

## 2018-01-01 RX ADMIN — DEXTROSE MONOHYDRATE 25 G: 25 INJECTION, SOLUTION INTRAVENOUS at 06:48

## 2018-01-01 RX ADMIN — Medication 0.5 MG: at 12:37

## 2018-01-01 RX ADMIN — DIPHENHYDRAMINE HYDROCHLORIDE 25 MG: 50 INJECTION, SOLUTION INTRAMUSCULAR; INTRAVENOUS at 23:33

## 2018-01-01 RX ADMIN — HYDROMORPHONE HYDROCHLORIDE 0.5 MG: 1 INJECTION, SOLUTION INTRAMUSCULAR; INTRAVENOUS; SUBCUTANEOUS at 12:58

## 2018-01-01 RX ADMIN — ENOXAPARIN SODIUM 40 MG: 40 INJECTION SUBCUTANEOUS at 09:56

## 2018-01-01 RX ADMIN — SODIUM CHLORIDE, POTASSIUM CHLORIDE, SODIUM LACTATE AND CALCIUM CHLORIDE: 600; 310; 30; 20 INJECTION, SOLUTION INTRAVENOUS at 09:27

## 2018-01-01 RX ADMIN — ONDANSETRON 4 MG: 2 INJECTION INTRAMUSCULAR; INTRAVENOUS at 11:09

## 2018-01-01 RX ADMIN — PANTOPRAZOLE SODIUM 40 MG: 40 INJECTION, POWDER, FOR SOLUTION INTRAVENOUS at 10:50

## 2018-01-01 RX ADMIN — SODIUM CHLORIDE: 9 INJECTION, SOLUTION INTRAVENOUS at 00:29

## 2018-01-01 RX ADMIN — MORPHINE SULFATE 4 MG: 4 INJECTION, SOLUTION INTRAMUSCULAR; INTRAVENOUS at 04:53

## 2018-01-01 RX ADMIN — ROCURONIUM BROMIDE 10 MG: 10 SOLUTION INTRAVENOUS at 10:00

## 2018-01-01 RX ADMIN — SODIUM CHLORIDE 8 MG/HR: 9 INJECTION, SOLUTION INTRAVENOUS at 18:45

## 2018-01-01 RX ADMIN — ONDANSETRON 4 MG: 2 INJECTION INTRAMUSCULAR; INTRAVENOUS at 03:41

## 2018-01-01 RX ADMIN — Medication 5 MG: at 09:48

## 2018-01-01 RX ADMIN — Medication 10 ML: at 10:54

## 2018-01-01 RX ADMIN — OXYCODONE HYDROCHLORIDE 5 MG: 5 TABLET ORAL at 16:00

## 2018-01-01 RX ADMIN — Medication 10 MG: at 15:27

## 2018-01-01 RX ADMIN — PANTOPRAZOLE SODIUM 40 MG: 40 INJECTION, POWDER, FOR SOLUTION INTRAVENOUS at 08:41

## 2018-01-01 RX ADMIN — OXYCODONE HYDROCHLORIDE 5 MG: 5 TABLET ORAL at 09:36

## 2018-01-01 RX ADMIN — SODIUM CHLORIDE 250 ML: 9 INJECTION, SOLUTION INTRAVENOUS at 01:51

## 2018-01-01 RX ADMIN — PANTOPRAZOLE SODIUM 40 MG: 40 INJECTION, POWDER, FOR SOLUTION INTRAVENOUS at 09:17

## 2018-01-01 RX ADMIN — PANTOPRAZOLE SODIUM 40 MG: 40 INJECTION, POWDER, FOR SOLUTION INTRAVENOUS at 09:02

## 2018-01-01 RX ADMIN — MEROPENEM 1 G: 1 INJECTION, POWDER, FOR SOLUTION INTRAVENOUS at 06:32

## 2018-01-01 RX ADMIN — ENOXAPARIN SODIUM 40 MG: 40 INJECTION SUBCUTANEOUS at 09:02

## 2018-01-01 RX ADMIN — METRONIDAZOLE 500 MG: 500 INJECTION, SOLUTION INTRAVENOUS at 15:55

## 2018-01-01 RX ADMIN — OXYCODONE HYDROCHLORIDE 5 MG: 5 TABLET ORAL at 15:10

## 2018-01-01 RX ADMIN — PANTOPRAZOLE SODIUM 40 MG: 40 INJECTION, POWDER, FOR SOLUTION INTRAVENOUS at 08:38

## 2018-01-01 RX ADMIN — ENOXAPARIN SODIUM 40 MG: 40 INJECTION SUBCUTANEOUS at 09:29

## 2018-01-01 RX ADMIN — Medication 10 ML: at 20:03

## 2018-01-01 RX ADMIN — SODIUM CHLORIDE 500 ML: 9 INJECTION, SOLUTION INTRAVENOUS at 08:23

## 2018-01-01 RX ADMIN — FENTANYL CITRATE 50 MCG: 50 INJECTION, SOLUTION INTRAMUSCULAR; INTRAVENOUS at 10:04

## 2018-01-01 RX ADMIN — Medication 10 MG: at 12:58

## 2018-01-01 RX ADMIN — PIPERACILLIN AND TAZOBACTAM 3.38 G: 3; .375 INJECTION, POWDER, FOR SOLUTION INTRAVENOUS at 02:46

## 2018-01-01 RX ADMIN — ALBUMIN (HUMAN) 250 ML: 12.5 INJECTION, SOLUTION INTRAVENOUS at 09:45

## 2018-01-01 RX ADMIN — ENOXAPARIN SODIUM 40 MG: 40 INJECTION SUBCUTANEOUS at 08:38

## 2018-01-01 RX ADMIN — OXYCODONE HYDROCHLORIDE 5 MG: 5 TABLET ORAL at 14:49

## 2018-01-01 RX ADMIN — OXYCODONE HYDROCHLORIDE 5 MG: 5 TABLET ORAL at 08:42

## 2018-01-01 RX ADMIN — MUPIROCIN: 20 OINTMENT TOPICAL at 21:00

## 2018-01-01 RX ADMIN — IOPAMIDOL 85 ML: 755 INJECTION, SOLUTION INTRAVENOUS at 05:48

## 2018-01-01 RX ADMIN — SODIUM CHLORIDE, POTASSIUM CHLORIDE, SODIUM LACTATE AND CALCIUM CHLORIDE: 600; 310; 30; 20 INJECTION, SOLUTION INTRAVENOUS at 09:19

## 2018-01-01 RX ADMIN — SODIUM CHLORIDE, POTASSIUM CHLORIDE, SODIUM LACTATE AND CALCIUM CHLORIDE: 600; 310; 30; 20 INJECTION, SOLUTION INTRAVENOUS at 16:51

## 2018-01-01 RX ADMIN — Medication 10 ML: at 21:35

## 2018-01-01 RX ADMIN — SODIUM CHLORIDE 80 MG: 9 INJECTION, SOLUTION INTRAVENOUS at 03:54

## 2018-01-01 RX ADMIN — Medication 1 MG: at 08:32

## 2018-01-01 RX ADMIN — SODIUM CHLORIDE 250 ML: 9 INJECTION, SOLUTION INTRAVENOUS at 11:22

## 2018-01-01 RX ADMIN — Medication 5 MG: at 08:30

## 2018-01-01 RX ADMIN — VANCOMYCIN HYDROCHLORIDE 1000 MG: 1 INJECTION, POWDER, LYOPHILIZED, FOR SOLUTION INTRAVENOUS at 10:49

## 2018-01-01 RX ADMIN — SODIUM CHLORIDE, POTASSIUM CHLORIDE, SODIUM LACTATE AND CALCIUM CHLORIDE: 600; 310; 30; 20 INJECTION, SOLUTION INTRAVENOUS at 17:20

## 2018-01-01 RX ADMIN — SODIUM CHLORIDE: 900 INJECTION, SOLUTION INTRAVENOUS at 20:03

## 2018-01-01 RX ADMIN — Medication 0.5 MG: at 12:29

## 2018-01-01 RX ADMIN — Medication 10 ML: at 10:37

## 2018-01-01 RX ADMIN — PANTOPRAZOLE SODIUM 40 MG: 40 INJECTION, POWDER, FOR SOLUTION INTRAVENOUS at 21:34

## 2018-01-01 RX ADMIN — GLYCOPYRROLATE 0.2 MG: 0.2 INJECTION, SOLUTION INTRAMUSCULAR; INTRAVENOUS at 09:54

## 2018-01-01 RX ADMIN — SODIUM CHLORIDE 8 MG/HR: 9 INJECTION, SOLUTION INTRAVENOUS at 19:45

## 2018-01-01 RX ADMIN — HYDROMORPHONE HYDROCHLORIDE 1 MG: 1 INJECTION, SOLUTION INTRAMUSCULAR; INTRAVENOUS; SUBCUTANEOUS at 11:37

## 2018-01-01 ASSESSMENT — PULMONARY FUNCTION TESTS
PIF_VALUE: 24
PIF_VALUE: 1
PIF_VALUE: 19
PIF_VALUE: 17
PIF_VALUE: 24
PIF_VALUE: 23
PIF_VALUE: 20
PIF_VALUE: 19
PIF_VALUE: 21
PIF_VALUE: 24
PIF_VALUE: 24
PIF_VALUE: 21
PIF_VALUE: 18
PIF_VALUE: 18
PIF_VALUE: 21
PIF_VALUE: 18
PIF_VALUE: 25
PIF_VALUE: 19
PIF_VALUE: 24
PIF_VALUE: 20
PIF_VALUE: 18
PIF_VALUE: 23
PIF_VALUE: 19
PIF_VALUE: 22
PIF_VALUE: 14
PIF_VALUE: 21
PIF_VALUE: 19
PIF_VALUE: 2
PIF_VALUE: 24
PIF_VALUE: 21
PIF_VALUE: 19
PIF_VALUE: 24
PIF_VALUE: 17
PIF_VALUE: 19
PIF_VALUE: 25
PIF_VALUE: 21
PIF_VALUE: 21
PIF_VALUE: 20
PIF_VALUE: 18
PIF_VALUE: 21
PIF_VALUE: 6
PIF_VALUE: 20
PIF_VALUE: 1
PIF_VALUE: 19
PIF_VALUE: 19
PIF_VALUE: 20
PIF_VALUE: 18
PIF_VALUE: 19
PIF_VALUE: 24
PIF_VALUE: 0
PIF_VALUE: 24
PIF_VALUE: 25
PIF_VALUE: 18
PIF_VALUE: 17
PIF_VALUE: 21
PIF_VALUE: 22
PIF_VALUE: 19
PIF_VALUE: 19
PIF_VALUE: 3
PIF_VALUE: 18
PIF_VALUE: 21
PIF_VALUE: 1
PIF_VALUE: 1
PIF_VALUE: 19
PIF_VALUE: 21
PIF_VALUE: 19
PIF_VALUE: 18
PIF_VALUE: 24
PIF_VALUE: 20
PIF_VALUE: 23
PIF_VALUE: 19
PIF_VALUE: 19
PIF_VALUE: 23
PIF_VALUE: 23
PIF_VALUE: 19
PIF_VALUE: 24
PIF_VALUE: 19
PIF_VALUE: 24
PIF_VALUE: 24
PIF_VALUE: 22
PIF_VALUE: 17
PIF_VALUE: 21
PIF_VALUE: 19
PIF_VALUE: 18
PIF_VALUE: 24
PIF_VALUE: 17
PIF_VALUE: 24
PIF_VALUE: 21
PIF_VALUE: 19
PIF_VALUE: 21
PIF_VALUE: 25
PIF_VALUE: 22
PIF_VALUE: 19
PIF_VALUE: 19
PIF_VALUE: 0
PIF_VALUE: 22
PIF_VALUE: 22
PIF_VALUE: 19
PIF_VALUE: 22
PIF_VALUE: 20
PIF_VALUE: 21
PIF_VALUE: 23
PIF_VALUE: 18
PIF_VALUE: 17
PIF_VALUE: 1
PIF_VALUE: 18
PIF_VALUE: 1
PIF_VALUE: 22
PIF_VALUE: 18
PIF_VALUE: 4
PIF_VALUE: 19
PIF_VALUE: 15
PIF_VALUE: 18
PIF_VALUE: 19
PIF_VALUE: 20
PIF_VALUE: 17
PIF_VALUE: 1
PIF_VALUE: 24
PIF_VALUE: 24
PIF_VALUE: 19
PIF_VALUE: 19
PIF_VALUE: 20
PIF_VALUE: 19
PIF_VALUE: 4
PIF_VALUE: 19
PIF_VALUE: 17
PIF_VALUE: 19
PIF_VALUE: 24
PIF_VALUE: 19
PIF_VALUE: 22
PIF_VALUE: 18
PIF_VALUE: 19
PIF_VALUE: 19
PIF_VALUE: 24
PIF_VALUE: 17
PIF_VALUE: 20
PIF_VALUE: 19

## 2018-01-01 ASSESSMENT — PAIN SCALES - WONG BAKER
WONGBAKER_NUMERICALRESPONSE: 2

## 2018-01-01 ASSESSMENT — PAIN SCALES - GENERAL
PAINLEVEL_OUTOF10: 0
PAINLEVEL_OUTOF10: 1
PAINLEVEL_OUTOF10: 5
PAINLEVEL_OUTOF10: 0
PAINLEVEL_OUTOF10: 6
PAINLEVEL_OUTOF10: 0
PAINLEVEL_OUTOF10: 0
PAINLEVEL_OUTOF10: 5
PAINLEVEL_OUTOF10: 3
PAINLEVEL_OUTOF10: 8
PAINLEVEL_OUTOF10: 0
PAINLEVEL_OUTOF10: 5
PAINLEVEL_OUTOF10: 4
PAINLEVEL_OUTOF10: 6
PAINLEVEL_OUTOF10: 1
PAINLEVEL_OUTOF10: 8
PAINLEVEL_OUTOF10: 6
PAINLEVEL_OUTOF10: 0
PAINLEVEL_OUTOF10: 4
PAINLEVEL_OUTOF10: 8
PAINLEVEL_OUTOF10: 7
PAINLEVEL_OUTOF10: 5
PAINLEVEL_OUTOF10: 5
PAINLEVEL_OUTOF10: 4
PAINLEVEL_OUTOF10: 2
PAINLEVEL_OUTOF10: 6
PAINLEVEL_OUTOF10: 4
PAINLEVEL_OUTOF10: 5
PAINLEVEL_OUTOF10: 8
PAINLEVEL_OUTOF10: 0
PAINLEVEL_OUTOF10: 2
PAINLEVEL_OUTOF10: 0
PAINLEVEL_OUTOF10: 4
PAINLEVEL_OUTOF10: 3
PAINLEVEL_OUTOF10: 0
PAINLEVEL_OUTOF10: 3
PAINLEVEL_OUTOF10: 3
PAINLEVEL_OUTOF10: 5
PAINLEVEL_OUTOF10: 5

## 2018-01-01 ASSESSMENT — PAIN DESCRIPTION - FREQUENCY
FREQUENCY: CONTINUOUS

## 2018-01-01 ASSESSMENT — PAIN DESCRIPTION - LOCATION
LOCATION: ABDOMEN

## 2018-01-01 ASSESSMENT — PAIN DESCRIPTION - PAIN TYPE
TYPE: SURGICAL PAIN
TYPE: ACUTE PAIN;SURGICAL PAIN
TYPE: SURGICAL PAIN
TYPE: SURGICAL PAIN
TYPE: ACUTE PAIN
TYPE: SURGICAL PAIN
TYPE: SURGICAL PAIN
TYPE: ACUTE PAIN

## 2018-01-01 ASSESSMENT — PAIN DESCRIPTION - ORIENTATION
ORIENTATION: MID
ORIENTATION: MID;LEFT
ORIENTATION: MID
ORIENTATION: LEFT;MID
ORIENTATION: MID;LEFT

## 2018-01-01 ASSESSMENT — PAIN DESCRIPTION - ONSET
ONSET: ON-GOING

## 2018-01-01 ASSESSMENT — PAIN DESCRIPTION - PROGRESSION

## 2018-01-01 ASSESSMENT — PAIN DESCRIPTION - DESCRIPTORS: DESCRIPTORS: OTHER (COMMENT)

## 2018-05-12 PROBLEM — I82.409 DVT (DEEP VENOUS THROMBOSIS) (HCC): Status: ACTIVE | Noted: 2018-01-01

## 2018-05-12 PROBLEM — K92.2 GIB (GASTROINTESTINAL BLEEDING): Status: ACTIVE | Noted: 2018-01-01

## 2018-05-14 PROBLEM — D62 ACUTE BLOOD LOSS ANEMIA: Status: RESOLVED | Noted: 2017-02-11 | Resolved: 2018-01-01

## 2018-05-14 PROBLEM — K92.2 GIB (GASTROINTESTINAL BLEEDING): Status: RESOLVED | Noted: 2018-01-01 | Resolved: 2018-01-01

## 2018-07-17 PROBLEM — K56.609 COLONIC OBSTRUCTION (HCC): Status: ACTIVE | Noted: 2018-01-01

## 2018-07-17 PROBLEM — K92.0 HEMATEMESIS WITH NAUSEA: Chronic | Status: ACTIVE | Noted: 2017-02-12

## 2018-07-17 NOTE — CONSULTS
at 07/17/18 0354    sodium chloride flush 0.9 % injection 10 mL  10 mL Intravenous 2 times per day Pito Cesar MD        magnesium hydroxide (MILK OF MAGNESIA) 400 MG/5ML suspension 30 mL  30 mL Oral Daily PRN Pito Cesar MD        morphine injection 2 mg  2 mg Intravenous Q2H PRN Pito Cesar MD   2 mg at 07/17/18 0146    Or    morphine injection 4 mg  4 mg Intravenous Q2H PRN Pito Cesar MD        sodium chloride flush 0.9 % injection 10 mL  10 mL Intravenous PRN Mayur Marti MD        magnesium hydroxide (MILK OF MAGNESIA) 400 MG/5ML suspension 30 mL  30 mL Oral Daily PRN Mayur Marti MD        ondansetron (ZOFRAN) injection 4 mg  4 mg Intravenous Q6H PRN Mayur Marti MD           Allergies: Allergies   Allergen Reactions    Sulfa Antibiotics      unknown childhood reaction       Social History:     Social History     Social History    Marital status:      Spouse name: N/A    Number of children: N/A    Years of education: N/A     Occupational History    Not on file. Social History Main Topics    Smoking status: Never Smoker    Smokeless tobacco: Never Used    Alcohol use No    Drug use: No    Sexual activity: Not on file     Other Topics Concern    Not on file     Social History Narrative    No narrative on file     History   Drug Use No     History   Alcohol Use No     History   Sexual Activity    Sexual activity: Not on file     History   Smoking Status    Never Smoker   Smokeless Tobacco    Never Used       Family History:     Family History   Problem Relation Age of Onset    Heart Disease Father        Review of Systems:     10 point ROS negative unless noted above     Physical Exam:     BP (!) 142/75   Pulse 66   Temp 97.6 °F (36.4 °C) (Oral)   Resp 18   Ht 5' 11\" (1.803 m)   Wt 153 lb 3.5 oz (69.5 kg)   SpO2 96%   BMI 21.37 kg/m²     CONSTITUTIONAL: awake, alert, cooperative, no apparent distress.   EYES:  Pupils equal, round and reactive to light, sclera non-icteric, conjunctiva normal  ENT:  Normocephalic, without obvious abnormality, atraumatic, sinuses nontender on palpation, external ears without lesions, oral pharynx with moist mucus membranes, no mucositis. NECK:  Supple, symmetrical, trachea midline, no adenopathy, thyroid symmetric, not enlarged and no tenderness, skin normal  HEMATOLOGIC/LYMPHATICS:  no cervical lymphadenopathy, no supraclavicular lymphadenopathy, no axillary lymphadenopathy and no inguinal lymphadenopathy  BACK:  Symmetric, no curvature, spinous processes are non-tender on palpation, paraspinous muscles are non-tender on palpation, no costal vertebral tenderness  LUNGS:  Clear to auscultation bilaterally, no crackles or wheezing  CARDIOVASCULAR:  Regular rate and rhythm, normal S1 and S2, no S3 or S4, and no murmur noted  ABDOMEN:  Normal bowel sounds, soft, non-distended, non-tender, no masses palpated, no hepatosplenomegally  MUSCULOSKELETAL:  There is no redness, warmth, or swelling of the joints  NEUROLOGIC:   No focal findings. SKIN:  Scattered bruising and ecchymoses. EXT: without clubbing, cyanosis or edema. Data:     CBC:  Recent Labs      07/17/18 0307   WBC  11.2*   HGB  10.4*   HCT  30.9*   MCV  90.6   PLT  203       BMP:  Recent Labs      07/17/18 0307   NA  134*   K  3.9   CO2  25   BUN  12   CREATININE  0.8       HEPATIC:  Recent Labs      07/17/18 0307   AST  24   ALT  11   ALKPHOS  324*   PROT  6.6   BILITOT  0.4       TUMOR MARKERS:  No results for input(s): PSA, CEA, , QI8904,  in the last 720 hours.       MAGNESIUM:    Lab Results   Component Value Date    MG 1.90 02/16/2017       PT/INR:    No results found for: Liquid[employer]    Lab Results   Component Value Date    INR 1.18 07/17/2018       PTT:    Lab Results   Component Value Date    APTT 35.4 07/17/2018       U/A:    Lab Results   Component Value Date    COLORU Yellow 12/07/2017    PHUR 5.5 12/07/2017    WBCUA 0-2 12/07/2017

## 2018-07-17 NOTE — PLAN OF CARE
no jugular venous distension and no carotid bruits   HEMATOLOGIC/LYMPHATIC: no cervical, supraclavicular or axillary lymphadenopathy   LUNGS: no increased work of breathing and clear to auscultation   CARDIOVASCULAR: regular rate and rhythm, normal S1 and S2, no murmur noted   ABDOMEN: normal bowel sounds x 4, soft, non-distended, non-tender, no masses palpated, no hepatosplenomegaly   MUSCULOSKELETAL: full range of motion noted, tone is normal   NEUROLOGIC: awake, alert, oriented to name, place and time. Motor skills grossly intact. SKIN: Normal skin color, texture, turgor and no jaundice.  appears intact   EXTREMITIES: no LE edema               Labs  CBC   Lab Results 07/05/2018 06/21/2018 06/07/2018 05/24/2018 05/13/2018 04/26/2018   CBC                     WBC x 10^3/uL 9.2 (H) 7.8 5.7 3.6 (L)    6.0   RBC x 10^6/uL 3.30 (L) 3.38 (L) 3.15 (L) 2.91 (L)    3.42 (L)   HGB g/dL 10.0 (L) 10.4 (L) 9.8 (L) 9.3 (L)    11.2 (L)   HCT % 31.4 (L) 32.6 (L) 30.9 (L) 29.3 (L)    34.9 (L)   MCV fL 95.2 (H) 96.4 (H) 98.1 (H) 100.7 (H)    102.0 (H)   MCH pg 30.3 30.8 31.1 32.0    32.7 (H)   MCHC g/dL 31.8 (L) 31.9 (L) 31.7 (L) 31.7 (L)    32.1 (L)   RDW-CV, % 15.4 (H) 15.1 (H) 14.7 (H) 14.8 (H)    15.9 (H)   PLT x 10^3/uL 162.0 (L) 159.0 (L) 110.0 (L) 107.0 (L)    117.0 (L)   [place] % 72.4 (H) 70.2 (H) 67.5 51.5    70.4 (H)   LY % 14.1 (L) 16.1 (L) 18.3 (L) 25.1    15.8 (L)   MO % 11.8 11.8 10.9 20.1 (H)    12.1   EO % 1.2 1.4 2.6 2.5    1.0   BA % 0.5 0.5 0.7 0.8    0.7   [place] # (ANC) x 10^3/uL 6.65 (H) 5.48 (H) 3.83 1.85    4.24   LY # x 10^3/uL 1.29 (L) 1.26 (L) 1.04 (L) 0.90 (L)    0.95 (L)   MO # x 10^3/uL 1.08 (H) 0.92 (H) 0.62 0.72    0.73   EO # x 10^3/uL 0.11 0.11 0.15 0.09    0.06   BA # x 10^3/uL 0.05 0.04 0.04 0.03    0.04   CMP   Lab Results 07/05/2018 06/21/2018 06/07/2018 05/24/2018 05/13/2018 04/26/2018   Chemistries                     Glucose mg/dL 124 (H) 88 109 112    109   BUN mg/dL 8 8 10 8    10   Creatinine performance without restriction. (Date: 07/05/2018)   Depression Status Was screened; Outcome positive: No; Screening Date: 02/08/2018; Screening Tool: Patient Health Questionnaire (PHQ9)   Psycho-social PHQ-9 Follow-up Plan (if applicable):      Assessment & Plan  Advanced pancreatic carcinoma  - CBC reviewed with the patient  -[id number] Ila Garcia to proceed with C15 reduced FOLFIRINOX    Constipation  -He is doing better taking miralax  -When he is taking this his bowels move fine, he tends to start and stop    Weight  -Doing better eating on a schedule.   -Encouarged small frequent meals    Anemia  -Iron today and next week  -Will recheck in 6 weeks      He is going out of town next week and would like to defer treatment until 8/2    . Recent imaging and labs were reviewed and discussed with the patient. Ulysses King, MSN, FNP-BC, AOCNP       Electronically signed by Ashley Mendoza.  José Miguel BELL 07/12/2018 12:03 EDT

## 2018-07-17 NOTE — H&P
due to 3.3 x 6.3 cm mass  - unclear whether this is pancreas met or maybe another primary malignancy. Patient reports pencil-thin stools for at least 6 months, denies hematochezia. - analgesia, antiemetics, IVFs  - surgery consulted. Will appreciate GI input as to whether colonic stent might be an option as well. Hematemesis  - baseline Hb fluctuates, but might be about 8. Monitor.    - he also had this in may and was found to have hemorrhagic gastritis. CT here showed extensive perigastric varices as well. PPI gtt. GI consulted. Metastatic pancreatic cancer   - has been treated with chemo, last dose 7/4/18, also has completed radiation. Oncology consulted. DVT Prophylaxis: SCDs  Diet: Diet NPO Effective Now Exceptions are: Ice Chips  Code Status: Full Code    PT/OT Eval Status: not indicated    Dispo - perhaps 7/21, pending consultant input and toleration of PO. He lives at home with his wife. Adonay Bonilla MD    Thank you Stella Navarrete MD for the opportunity to be involved in this patient's care. If you have any questions or concerns please feel free to contact me at [phone].

## 2018-07-17 NOTE — PROGRESS NOTES
Pt received 2L bolus. Concerned about getting dehydrated since NPO. Wanted to know if he should continue to receive IVF. Thanks!  Sent to Dr. Kalpana Mercado

## 2018-07-17 NOTE — ED PROVIDER NOTES
Emergency Physician Note    Chief Complaint  Hematemesis (Pt presents to triage for report of 4 episodes of vomiting blood in last 2 hrs. Hx pancreatic cancer.)       History of Present Illness  Gilbert Villareal is a 79 y.o. male who presents to the ED for abdominal pain and hematic emesis. Patient reports that for the past 4 or 5 days he's not had a bowel movement. He states that he's also had abdominal pain that started yesterday. He states that he woke up this morning and vomited 4 times and he it was bloody. He states it was less than half a cup and all. Patient does have a history of pancreatic cancer with metastatic disease. He is only been treated with chemo and radiation but no surgeries. Patient had a similar episode of hematic emesis and May of this year. That time it was not associated with abdominal pain. Patient had a an upper endoscopy in May and was found to have hemorrhagic gastritis. Denies fever, chills, malaise, chest pain, shortness of breath, cough, diarrhea, headache, sore throat, dysuria, back pain, rash. No palliative/provocative factors. Positives and pertinent negatives as per HPI. All other of the 10 systems were reviewed and are negative. I have reviewed the following from the nursing documentation:      Prior to Admission medications    Medication Sig Start Date End Date Taking?  Authorizing Provider   pantoprazole (PROTONIX) 40 MG tablet Take 1 tablet by mouth 2 times daily 5/14/18  Yes Izabella Johnson MD   acidophilus (LACTINEX/FLORANEX) Take 1 tablet by mouth 2 times daily   Yes Historical Provider, MD   calcium carbonate 600 MG TABS tablet Take 1 tablet by mouth 2 times daily 12/9/17  Yes Joi Izaguirre MD       Allergies as of 07/17/2018 - Review Complete 07/17/2018   Allergen Reaction Noted    Sulfa antibiotics  08/18/2012       Past Medical History:   Diagnosis Date    Abdominal pain     Anemia     Blood in stool     Cancer ([address] 12/2016 no rhochi, no rales  ABDOMEN:  Mildly distended, diffuse abdominal tenderness with mild guarding. no rebound. No costovertebral angle tenderness to palpation. Normal BS, no organomegaly, no abdominal masses  EXTREMITIES:  Normal range of motion, no edema, no tenderness, no deformity, distal pulses present. Moves extremities x4 with purpose. SKIN: Warm, dry and intact. NEUROLOGIC: Normal mental status. Moving all extremities to command. Alert and oriented x4 without focal deficit or gross sensory deficit. Normal speech. PSYCHIATRIC: Not anxious, normal mood and affect, thoughts are linear and organized, without delusions/hallucinations, responds appropriately to questions      LABS and DIAGNOSTIC RESULTS  EKG  The Ekg interpreted by me shows  normal sinus rhythm with a rate of 71  Axis is   Normal  QTc is  normal  Intervals and Durations are unremarkable. ST Segments: normal  Delta waves, Brugada Syndrome, and Short [place] are not present. No significant change from prior EKG dated May 12, 2018    Cardiac Monitor Strip Interpretation    Interpreted by me  Monitor strip interpreted for greater than 10 seconds    Rhythm: normal sinus   Rate: normal  Ectopy: none  ST Segments: normal    RADIOLOGY  X-RAYS:  I have reviewed radiologic plain film image(s). ALL OTHER NON-PLAIN FILM IMAGES SUCH AS CT, ULTRASOUND AND MRI HAVE BEEN READ BY THE RADIOLOGIST. XR Acute Abd Series Chest 1 VW   Preliminary Result   1. No acute pulmonary process. 2. Colonic distention with the cecum measuring 10 cm. Findings which may   represent colitis or possible colonic ileus. 3. No evidence of perforation.          CT ABDOMEN PELVIS W IV CONTRAST Additional Contrast? None    (Results Pending)        LABS  Results for orders placed or performed during the hospital encounter of 07/17/18   Comprehensive Metabolic Panel w/ Reflex to MG   Result Value Ref Range    Sodium 134 (L) 136 - 145 mmol/L    Potassium reflex Magnesium 3.9 3.5

## 2018-07-17 NOTE — PROGRESS NOTES
PS hosp @ 1718  Re: pancratic cancer, hematemesis, new colon mass  Second attempt @2466  Dr. Lyn Hare returned call to Dr. Verna Flores @[phone]

## 2018-07-17 NOTE — ED NOTES
Bedside report given. [facility] called for verification pt on tele box 21. Pt transported to floor via stretcher. All personal belongings sent with patient's wife.       [facility]  07/17/18 2639

## 2018-07-17 NOTE — CONSULTS
Department of General Surgery Consult    PATIENT NAME: Audra Schrader   YOB: 1950    ADMISSION DATE: 7/17/2018  3:03 AM      TODAY'S DATE: 7/17/2018    Reason for Consult:  Colonic obstruction    Chief Complaint: hematemesis    Requesting Physician:  Pau    HISTORY OF PRESENT ILLNESS:              The patient is a 79 y.o. male who presents with hematemesis. Reports progressive narrowing of bowel movements over several months with no bowel movements for the past six days. Has had some flatus. Some distention and nausea with mild abdominal pain. Had emesis and hematemesis which prompted him to come to ER. Has history of pancreatic cancer and last got chemotherapy two weeks prior. Colonoscopy in 2016 with polypectomy.     Past Medical History:        Diagnosis Date    Abdominal pain     Anemia     Blood in stool     Cancer ([address] 12/2016    PANCREATIC    History of blood transfusion 01/13/2017    LAST 2 UNITS       Past Surgical History:        Procedure Laterality Date    COLONOSCOPY  12/2016    polyp    LIVER BIOPSY  01/03/2017    TUNNELED VENOUS PORT PLACEMENT  2017    Smart Port (placed at [facility])   [address]  01/12/2017    gastric mass biopsy     UPPER GASTROINTESTINAL ENDOSCOPY  02/17/2017    UPPER GASTROINTESTINAL ENDOSCOPY  12/08/2017    duodenitis, gastritis    UPPER GASTROINTESTINAL ENDOSCOPY  05/13/2018       Current Medications:   Current Facility-Administered Medications: pantoprazole (PROTONIX) 80 mg in sodium chloride 0.9 % 100 mL infusion, 8 mg/hr, Intravenous, Continuous  sodium chloride flush 0.9 % injection 10 mL, 10 mL, Intravenous, 2 times per day  magnesium hydroxide (MILK OF MAGNESIA) 400 MG/5ML suspension 30 mL, 30 mL, Oral, Daily PRN  morphine injection 2 mg, 2 mg, Intravenous, Q2H PRN **OR** morphine injection 4 mg, 4 mg, Intravenous, Q2H PRN  sodium chloride flush 0.9 % injection 10 mL, 10 mL, Intravenous, PRN  magnesium hydroxide (MILK OF MAGNESIA) 400 MG/5ML suspension 30 mL, 30 mL, Oral, Daily PRN  ondansetron (ZOFRAN) injection 4 mg, 4 mg, Intravenous, Q6H PRN  Prior to Admission medications    Medication Sig Start Date End Date Taking? Authorizing Provider   pantoprazole (PROTONIX) 40 MG tablet Take 1 tablet by mouth 2 times daily 5/14/18  Yes Jacques Potts MD   acidophilus (LACTINEX/FLORANEX) Take 1 tablet by mouth 2 times daily   Yes Historical Provider, MD   calcium carbonate 600 MG TABS tablet Take 1 tablet by mouth 2 times daily 12/9/17  Yes Arslan Izaguirre MD        Allergies:  Sulfa antibiotics    Social History:   TOBACCO:  no  ETOH:  no    Family History:    Family History   Problem Relation Age of Onset    Heart Disease Father        REVIEW OF SYSTEMS:  CONSTITUTIONAL:  negative  HEENT:  negative  RESPIRATORY:  negative  CARDIOVASCULAR:  negative  GASTROINTESTINAL:  negative except for nausea, vomiting, abdominal pain and abdominal distention  GENITOURINARY:  negative  HEMATOLOGIC/LYMPHATIC:  negative  NEUROLOGICAL:  Negative  * All other ROS reviewed and negative. PHYSICAL EXAM:  VITALS:  BP (!) 144/79   Pulse 56   Temp 97.9 °F (36.6 °C) (Oral)   Resp 14   Ht 5' 11\" (1.803 m)   Wt 153 lb 3.5 oz (69.5 kg)   SpO2 97%   BMI 21.37 kg/m²   24HR INTAKE/OUTPUT:    No intake/output data recorded. No intake/output data recorded.     CONSTITUTIONAL:  alert, no apparent distress and normal weight  EYES:  PERRL, sclera clear  ENT:  Normocepalic,atraumatic, without obvious abnormality  NECK:  supple, symmetrical, trachea midline  LUNGS: Resp effort easy and unlabored, no crackles or wheezing  CARDIOVASCULAR:  NO JVD, regular rate and rhythm and no murmur noted  ABDOMEN:  , normal bowel sounds, soft, distended, mild tender, voluntary guarding absent, no masses palpated  MUSCULOSKELETAL: No clubbing or cyanosis, 0+ pitting edema lower extremities  NEUROLOGIC:  Mental Status Exam:  Level of Alertness: awake  PSYCHIATRIC:   person, place, time  SKIN:  no bruising or bleeding    DATA:    CBC:   Recent Labs      07/17/18 0307   WBC  11.2*   HGB  10.4*   HCT  30.9*   PLT  203     BMP:    Recent Labs      07/17/18 0307   NA  134*   K  3.9   CL  98*   CO2  25   BUN  12   CREATININE  0.8   GLUCOSE  148*     Hepatic:   Recent Labs      07/17/18 0307   AST  24   ALT  11   BILITOT  0.4   ALKPHOS  324*     Mag:    No results for input(s): MG in the last 72 hours. Phos:   No results for input(s): PHOS in the last 72 hours. INR:   Recent Labs      07/17/18 0307   INR  1.18*       Radiology Review: Images personally reviewed by me. CT -   1. Stable pancreatic tail 1.9 cm mass. 2. Significant progressive hepatic metastatic disease. 3. Associated with the proximal and distal sigmoid colon there is a   hyperdense 3.3 x 6.3 cm mass resulting in distal colonic obstruction with   moderate distention.  This may represent primary colonic malignancy versus   peritoneal metastatic disease. 4. Extensive perigastric varices which likely relate to the patient's history   of hematemesis. 5. New ascites. 6. Stable splenomegaly with innumerable indeterminate hypodense lesions   measuring less than 1 cm. 7. Prostatomegaly.               IMPRESSION/RECOMMENDATIONS:    78 yo with colonic obstruction  1. Given normal colonoscopy from two years ago, as well as progressive pancreatic cancer on current imaging, I suspect the obstructing mass is metastatic from the pancreas. 2.  GI to provide input on stenting, but there may be two points of obstruction on the imaging, thus making this difficult. 3.  Will likely need diverting colostomy which can be arranged for tomorrow if stenting it not possible.     Prabhu [facility] Surgery  [id number]

## 2018-07-17 NOTE — ED PROVIDER NOTES
[place] of Care Note:    I assumed care of this patient at 06:00 from Dr. Augustin Shane, please see her note for more detail. Briefly, this pt is a 79 y.o.. M with hx of pancreatic cancer with mets to liver who presents with hematemesis for 2 hours prior to arrival as well as moderate abdominal pain. Patient with similar episode in May and was found to have hemorrhagic gastritis in May. Given abdomen pain a plane for the abdomen is obtained which shows distention of the colon concerning for possible colitis versus ileus. Given the patient's pain, his comorbid conditions, and this abnormal x-ray a CT abdomen and pelvis is ordered to further evaluate for emergent pathology. The patient is signed out with plan to follow-up imaging results, reevaluate the patient, and likely admission given his comorbid conditions. Imaging shows a 3 x 6 cm mass resulting in distal colonic obstruction with moderate distention which I feel is likely causing the patient's pain. He is reevaluated and continues to be clinically stable. Given this mass, his comorbid conditions, and his pain I feel he warrants admission for specialty consultation and further evaluation. The patient and his wife expressed understanding and agreement with this plan. Per his request his cancer team was contacted and made aware of the patient's admission, they report they will evaluate him in the hospital.  The patient is admitted to the hospitalist service for further management. FINAL IMPRESSION      1. Hematemesis with nausea    2.  Pancreatic cancer metastasized to liver [facility])             Lovey Klinefelter, MD  07/17/18 1863

## 2018-07-18 NOTE — PROGRESS NOTES
Pt transferred to floor from ED. Call light, phone, and overbed table in reach. Pt is alert & oriented, vitals complete and pt is Independent.  Will continue monitor

## 2018-07-18 NOTE — ANESTHESIA PRE PROCEDURE
Department of Anesthesiology  Preprocedure Note       Name:  Kaitlin Pagan   Age:  79 y.o.  :  1950                                          MRN:  [id number]         Date:  2018      Surgeon: Joss Castillo):  Vijay Wells MD    Procedure: Procedure(s):  DIAGNOSTIC LAPAROSCOPY, POSSIBLE COLECTOMY, POSSIBLE COLOSTOMY, POSSIBLE OPEN PROCEDURE    Medications prior to admission:   Prior to Admission medications    Medication Sig Start Date End Date Taking?  Authorizing Provider   pantoprazole (PROTONIX) 40 MG tablet Take 1 tablet by mouth 2 times daily 18  Yes Mancil Severin, MD   acidophilus (LACTINEX/FLORANEX) Take 1 tablet by mouth 2 times daily   Yes Historical Provider, MD   calcium carbonate 600 MG TABS tablet Take 1 tablet by mouth 2 times daily 17  Yes José Miguel Izaguirre MD       Current medications:    Current Facility-Administered Medications   Medication Dose Route Frequency Provider Last Rate Last Dose    ceFAZolin (ANCEF) 2 g in sterile water 20 mL IV syringe  2 g Intravenous On Call to Gia Chambers MD        metronidazole (FLAGYL) 500 mg in NaCl 100 mL IVPB premix  500 mg Intravenous On Call to Gia Chambers MD        pantoprazole (PROTONIX) 80 mg in sodium chloride 0.9 % 100 mL infusion  8 mg/hr Intravenous Continuous Kareem Wong MD 10 mL/hr at 18 0509 8 mg/hr at 18 0509    sodium chloride flush 0.9 % injection 10 mL  10 mL Intravenous 2 times per day Bety Mcconnell MD   10 mL at 18 2333    magnesium hydroxide (MILK OF MAGNESIA) 400 MG/5ML suspension 30 mL  30 mL Oral Daily PRN Beyt Mcconnell MD        morphine injection 2 mg  2 mg Intravenous Q2H PRN Bety Mcconnell MD   2 mg at 18 9854    Or    morphine injection 4 mg  4 mg Intravenous Q2H PRN Bety Mcconnell MD        sodium chloride flush 0.9 % injection 10 mL  10 mL Intravenous PRN Jose Moore MD        magnesium hydroxide (MILK Vitals:    07/17/18 2031 07/17/18 2332 07/18/18 0351 07/18/18 0709   BP: (!) 144/56 (!) 147/81 132/73 138/76   Pulse: 72 76 82 78   Resp: 18 16 18 16   Temp: 98.4 °F (36.9 °C) 97.6 °F (36.4 °C) 97.6 °F (36.4 °C) 97.6 °F (36.4 °C)   TempSrc: Oral Oral Oral Oral   SpO2: 94% 95% 96% 95%   Weight:       Height:                                                  BP Readings from Last 3 Encounters:   07/18/18 138/76   05/14/18 136/78   12/09/17 115/74       NPO Status:                                                                                 BMI:   Wt Readings from Last 3 Encounters:   07/17/18 153 lb 3.5 oz (69.5 kg)   05/13/18 159 lb 11.2 oz (72.4 kg)   12/09/17 166 lb 6.4 oz (75.5 kg)     Body mass index is 21.37 kg/m². CBC:   Lab Results   Component Value Date    WBC 17.7 07/18/2018    RBC 2.73 07/18/2018    RBC 3.53 08/23/2017    HGB 8.1 07/18/2018    HCT 24.8 07/18/2018    MCV 91.1 07/18/2018    RDW 17.2 07/18/2018     07/18/2018       CMP:   Lab Results   Component Value Date     07/18/2018    K 4.1 07/18/2018     07/18/2018    CO2 25 07/18/2018    BUN 16 07/18/2018    CREATININE 0.6 07/18/2018    GFRAA >60 07/18/2018    GFRAA >60 08/18/2012    AGRATIO 1.0 07/18/2018    LABGLOM >60 07/18/2018    GLUCOSE 129 07/18/2018    GLUCOSE 109 04/27/2017    PROT 5.2 07/18/2018    PROT 5.5 04/27/2017    CALCIUM 8.5 07/18/2018    BILITOT 0.4 07/18/2018    ALKPHOS 248 07/18/2018    AST 16 07/18/2018    ALT 8 07/18/2018       POC Tests: No results for input(s): POCGLU, POCNA, POCK, POCCL, POCBUN, POCHEMO, POCHCT in the last 72 hours.     Coags:   Lab Results   Component Value Date    PROTIME 14.9 07/18/2018    INR 1.31 07/18/2018    APTT 35.4 07/17/2018       HCG (If Applicable): No results found for: PREGTESTUR, PREGSERUM, HCG, HCGQUANT     ABGs: No results found for: PHART, PO2ART, JFD2IYC, PAN1UWD, BEART, G1EDNPAR     Type & Screen (If Applicable):  No results found for: LABABO, [address]    Anesthesia

## 2018-07-18 NOTE — PLAN OF CARE
Problem: Nutrition  Goal: Optimal nutrition therapy  Outcome: Ongoing  Nutrition Problem: Inadequate oral intake  Intervention: Food and/or Nutrient Delivery: Start oral diet, Start ONS (Diet progression per MD)  Nutritional Goals: Pt will tolerate diet and have intakes 50% or greater this admission

## 2018-07-18 NOTE — CONSULTS
gastritis. He has recently developed  worsening abdominal pain, nausea, vomiting and change in bowel habits  over the past month. He presented to the emergency room this morning  after having episodes of hematemesis. REVIEW OF SYSTEMS:  CONSTITUTIONAL:  No fevers, sweats or chills. Positive for weight loss. ENT:  No sore throat. No double vision. No  blurry vision. CARDIOVASCULAR:  No chest pain. No shortness of breath. No dyspnea on exertion. RESPIRATORY:  No cough. No sputum production  or hemoptysis. GI:  As per HPI. :  No dysuria, hematuria, urinary  hesitancy or frequency. SKIN:  No jaundice, rash or bruising. PAST MEDICAL HISTORY:  Stage IV pancreas cancer, DVT, and iron  deficiency anemia. PAST SURGICAL HISTORY:  Port placement. CURRENT MEDICATIONS:  At home pantoprazole, acidophilus, and calcium  carbonate. ALLERGIES:  SULFA ANTIBIOTICS. SOCIAL HISTORY:  Denies tobacco use, alcohol use, and drug use. FAMILY HISTORY:  Negative for GI malignancy. PHYSICAL EXAMINATION:  GENERAL:  He is alert, awake, oriented x3, well-developed,  well-nourished male who appears to be mildly cachectic. VITAL SIGNS:  Temperature 98.4, pulse 72, respirations 18, blood  pressure 144/56, saturations 94%. Weight is 153 pounds. HEENT:  NC/AT, PERRLA, dry mucous membranes. NECK:  Supple. No thyromegaly. No cervical lymphadenopathy. No JVD. HEART:  Regular rate and rhythm without murmurs, gallops, or rubs. LUNGS:  Clear to auscultation with bibasilar rales. ABDOMEN:  Soft, nondistended, tender to palpation. Bowel sounds are  present. EXTREMITIES:  No clubbing, cyanosis, or edema. LABORATORY DATA:  White blood cell count 11.2, hemoglobin 10.4,  hematocrit 30.9 and platelets 257. Sodium 134, potassium 3.9, chloride  98, bicarb 25, BUN 12, creatinine 0.8 and glucose 148. Liver profile:    Alk phos 324, AST 24, ALT 10, total protein 6.6, albumin 3.2 and total  bilirubin 0.4.    IMAGIN.  CT scan of the abdomen and pelvis, stable pancreas tail with 1.9 cm  mass. 2.  Significant progression of hepatic metastatic disease. 3.  Associated with proximal and distal sigmoid colon, there is  hypodense 3.3 x 6.3 cm mass, resulting in distal colonic obstruction  with moderate distention. 4.  Extensive perigastric varices which is likely related to the  patient's history of hematemesis. 5.  New ascites. 6.  Stable splenomegaly with enumerable indeterminate hypodense lesions  measuring less than 1 cm. 7.  Prostatomegaly. IMPRESSION:  This is a 49-year-old male with history of stage IV  pancreas adenocarcinoma to the liver, spleen, and stomach, status post  chemoradiation therapy, complicated by anemia and DVT status post IVC  filter, now admitted with the colonic obstruction likely secondary to  metastatic pancreas adenocarcinoma. Primary colon cancer is unlikely in  light of normal colonoscopy in 2016. RECOMMENDATIONS:  1. Continue supportive care. 2.  N.p.o., IV fluids, pain control. 3.  NG decompression if necessary. 4.  Check abdominal x-ray in the morning. 5.  Colonoscopy is of no yield and stenting is unlikely to be effective  and comes with potential high risk, given the two separate lesions in  the sigmoid colon. 6.  I discussed this with the Surgery and recommended diverting  colostomy as the best palliative approach for this patient.         Judy Brasher MD    D: 2018 21:44:15       T: 2018 0:11:06     [id number]  Job#: [id number]     Doc#: [id number]    CC:  MD Kinza Centeno

## 2018-07-18 NOTE — PROGRESS NOTES
less than 1 cm. 7. Prostatomegaly. Assessment/Plan:    Active Hospital Problems    Diagnosis Date Noted    Colonic obstruction [K56.609] 07/17/2018    Malignant neoplasm of pancreas ([facility] [address] [C25.9]     Liver metastasis ([facility] [address] [C78.7] 01/12/2017    Malignant neoplasm of tail of pancreas ([facility] [address] [C25.2] 12/29/2016     79 Y M with a h/o metastatic pancreatic cancer had not had a bowel movement for 6 days, with progressive abdominal discomfort and nausea. This morning he vomited about 6 ounces of bright red blood, so he came to the ED.  found to have distal colonic obstruction 2/2 mass, now s/p resection        Distal colonic obstruction, due to 3.3 x 6.3 cm mass - now s/p open diverting loop colostomy, pelvic mass biopsy. at this point likely 2/2 metastatic pancreatic ca rather than new primary ca   - analgesia, antiemetics, IVFs  - surgery consult appreciate- postoperative pain control via PCA   - IS  - DVT ppx    - follow up pathology     Hematemesis  - baseline Hb fluctuates, but might be about 8. Monitor.    - he also had this in may and was found to have hemorrhagic gastritis. CT here showed extensive perigastric varices as well. PPI gtt to continue for now. GI consulted. may be able to d/c tomorrow if no further evidence of hematemsis      Metastatic pancreatic cancer   - has been treated with chemo, last dose 7/4/18, also has completed radiation.   Oncology consulted, appreciate recs     Moderate malnutrition      DVT Prophylaxis: lovenox once stable postop   Diet: Diet NPO Effective Now Exceptions are: Ice Chips  Code Status: Full Code    PT/OT Eval Status: not yet consulted     Dispo - > 2 d inpatient     Eugenia Lee MD

## 2018-07-18 NOTE — PROGRESS NOTES
VSS - afebrile. Pt is alert and oriented x 4 with no history of falls. Assessment completed as charted. Bed is in lowest position with 2/4 bed rails raised. Bed alarm turned on, wheels locked, and call light within reach. Patient wearing non-skid socks and verbalizes understanding to call out for assistance. No further requests at this time. Will continue to monitor patient.     Vitals:    07/17/18 2332   BP: (!) 147/81   Pulse: 76   Resp: 16   Temp: 97.6 °F (36.4 °C)   SpO2: 95%     Electronically signed by Racquel Salazar RN on 7/18/2018 at 1:00 AM

## 2018-07-18 NOTE — PLAN OF CARE
Problem: Pain:  Goal: Control of acute pain  Control of acute pain   Outcome: Ongoing  Patient's pain level controlled at this time. Will continue to monitor.

## 2018-07-18 NOTE — PROGRESS NOTES
[facility]    PATIENT NAME: Jacques Morfin     TODAY'S DATE: 7/18/2018    CHIEF COMPLAINT: nausea    INTERVAL HISTORY/HPI:    Pt with some nausea/emesis, feels distended, mild pain, no fevers. REVIEW OF SYSTEMS:  Pertinent positives and negatives as per interval history section    OBJECTIVE:  VITALS:  /76   Pulse 78   Temp 97.6 °F (36.4 °C) (Oral)   Resp 16   Ht 5' 11\" (1.803 m)   Wt 153 lb 3.5 oz (69.5 kg)   SpO2 95%   BMI 21.37 kg/m²     INTAKE/OUTPUT:    I/O last 3 completed shifts: In: 1605.8 [I.V.:1605.8]  Out: 575 [Urine:550; Emesis/NG output:25]  No intake/output data recorded. CONSTITUTIONAL:  awake and alert  LUNGS:  Respirations easy and unlabored, no crackles or wheezing  CARD:  regular rate and rhythm  ABDOMEN:  hypoactive bowel sounds, soft, distended, tender     Data:  CBC:   Recent Labs      07/17/18   0307 07/17/18   1655  07/18/18   0523   WBC  11.2*   --   17.7*   HGB  10.4*  9.8*  8.1*   HCT  30.9*  30.1*  24.8*   PLT  203   --   205     BMP:    Recent Labs      07/17/18 0307 07/18/18   0523   NA  134*  137   K  3.9  4.1   CL  98*  105   CO2  25  25   BUN  12  16   CREATININE  0.8  0.6*   GLUCOSE  148*  129*     Hepatic:   Recent Labs      07/17/18 0307 07/18/18   0523   AST  24  16   ALT  11  8*   BILITOT  0.4  0.4   ALKPHOS  324*  248*     Mag:    No results for input(s): MG in the last 72 hours. Phos:   No results for input(s): PHOS in the last 72 hours. INR:   Recent Labs      07/17/18 0307 07/18/18   0523   INR  1.18*  1.31*       Radiology Review:  *Imaging personally reviewed by me. AXR - colonic dilatation      ASSESSMENT AND PLAN:  78 yo with large bowel obstruction  1. Case discussed with Gastroenterology, Oncology and patient/family. At this point stenting not an option. Radiation not felt to be an option either based on degree of dilatation and possibility of bowel perforation. Recommend OR for likely diverting colostomy.   Patient

## 2018-07-18 NOTE — OP NOTE
Date of Surgery: 7/18/18    Preop Dx:  Sigmoid Colon Obstruction, Metastatic Pancreatic Cancer    Postop Dx:  Same    Procedure:  Laparoscopic Converted to Open Diverting Loop Colostomy, Mobilization of Splenic Flexure, Biopsy of Pelvic Mass    Surgeon:  Dinesh Denton    Assistant:      Anesthesia:  GETA    EBL:   200ml    Specimen:  Pelvic mass biopsy    Complications: none    Drains/Lines:  none    Indications:  80 yo with history of metastatic pancreatic cancer with development of distal colonic obstruction due to large mass. Description:       Patient was given adequate description of the risks and rewards of the procedure, including bleeding, infection, injury to surrounding structures, need for further operations and freely consented. He was given appropriate antibiotics and brought to the OR where GETA anesthesia was induced. He was placed in supine position. Prepped and draped in usual sterile fashion. Subumbilical incision made with #15 blade and 5mm optiview trocar inserted without issue. Abdomen insufflated to 15mmHg pressure. Colon inspected and was very distended and fixed in pelvis. A serosal tear from distention was also seen on the cecum. No evidence of perforation. Two other 5mm trocars were inserted, one in upper midline and one in right lower abdomen. The descending colon was mobilized along the white line of toldt using sonacision and blunt dissection. Given the distention and fixation in the pelvis it could not be mobilized well enough for proposed colostomy. Therefore we converted to an open operation. Incision was made from symphysis pubis to just above the umbilicus in the midline. The incision was carried down through the dermis, subcutaneous fat, and linea alba using electrocautery. Preperitoneal fat was incised using electrocautery. Peritoneum was grasped with pickups. Small incision was made in the peritoneal cavity.  The preperitoneal fat and peritoneum were then incised along the length of the incision. Attention was then turned to the sigmoid colon. The lateral attachments of the colon were freed up further using electrocautery along the white line of Toldt. Left ureter was identified in the left retroperitoneal space and not injured. The sigmoid colon was inspected. The descending colon was quite dilated but the sigmoid colon was not, thus I felt that there was not a distal sigmoid/proximal rectal second point of obstruction. The mass was palpated and adherent to both the distal descending colon and left lateral pelvis overlying the iliac vessels. Given this location I did not attempt to resect it. The mass was posterior to the colon but a small biopsy of an exposed portion was done and sent to pathology. The splenic flexure was then mobilized using electrocautery and blunt dissection. This allowed for a proposed loop descending colostomy to reach without tension to the left abdominal wall. The cecal serosal tear was repaired with 3-0 silk lembert sutures. A purse string was placed in the descending colon with 2-0 silk and colon decompressed. Purse string was then tied. Abdomen was saline lavaged. The skin at the colostomy site was grasped with a kocher and a quarter sized incision made. This was carried down to rectus fascia using electrocautery and a cruciate incision made in the fascia with electrocautery. The rectus was split using retractors and peritoneum incised with electrocautery. This allowed for passage of three fingers. The descending colon was then grasped with a odilon clamp at the site of the purse string and brought through the abdominal wall in a tension free, nontwisting manner. The midline incision was then closed at the fascial level with looped 0-0 PDS suture x 2. Staples were used to close the epidermis and other trocar sites. This wound was covered with a sterile towel.   The descending colon had the purse string removed and colon opened further with electrocautery. The loop colostomy was then created in a brooked fashion using interrupted 3-0 vicryl suture. An ostomy device was then placed. The proximal end was superolateral and distal end inferomedial in location. Sterile dressing placed. All suture, sponge and instrument count correct times two at end of case. Transferred to PACU in stable condition.     Mague Vaca MD

## 2018-07-18 NOTE — ANESTHESIA POSTPROCEDURE EVALUATION
Department of Anesthesiology  Postprocedure Note    Patient: Asif Jimenez  MRN: [id number]  YOB: 1950  Date of evaluation: 7/18/2018  Time:  4:15 PM     Procedure Summary     Date:  07/18/18 Room / Location:  [facility]    Anesthesia Start:  0679 Anesthesia Stop:  9819    Procedure:  DIAGNOSTIC LAPAROSCOPY, CONVERTED TO OPEN BOWEL RESECTION AND CREATION OF COLOSTOMY (N/A Abdomen) Diagnosis:  (BOWEL OBSTRUCTION)    Surgeon:  Karley Wiley MD Responsible Provider:  Ronnie Hubbard MD    Anesthesia Type:  general ASA Status:  4          Anesthesia Type: No value filed. Cristina Phase I: Cristina Score: 9    Cristina Phase II: Cristina Score: 9    Last vitals: Reviewed and per EMR flowsheets.        Anesthesia Post Evaluation    Patient location during evaluation: PACU  Level of consciousness: awake  Complications: no  Respiratory status: acceptable

## 2018-07-18 NOTE — FLOWSHEET NOTE
Arrived from A[facility]. Bedside report done. A/O x4. Family bedside. NGT to CLWS. PCA pump in hand. Pt resting. Will monitor.

## 2018-07-19 NOTE — PROGRESS NOTES
Pt alert and oriented, VSS. Shift assessment completed and documented. NG to CLWS, f/c in place. Pt verbalizes understanding of how to use PCA pump, states pain is well controlled. Pt denies any needs at this time. Bed locked and in lowest position, call light within reach. Will continue to monitor.

## 2018-07-19 NOTE — CARE COORDINATION
CASE MANAGEMENT INITIAL ASSESSMENT      Reviewed chart and met with patient today, re: 79 y.o. male  Explained Case Management role/services. Family present: none  Primary contact information: katja escoto [phone]    Admit date/status: 7/17/18  Diagnosis: bowel resection    Insurance: [facility]  Precert required for SNF - Y     3 night stay required - N    Living arrangements, Adls, care needs, prior to admission: lives in ranch home with wife 1 step entry    Transportation: d    [email or web address] [place] at home: Walker__Cane__RTS__ BSC__Shower Chair__  02__ HHN__ CPAP__  BiPap__  Hospital Bed__ W/C___ Other__________    Services in the home and/or outpatient, prior to admission: none    PT/OT recs: none    Hospital Exemption Notification (HEN): not initiated    Barriers to discharge: none    Plan/comments: patient plans on returning home at discharge. States he is independent in ADL's and drives. Was active in past with [facility] ok to resume. [facility] accepted will follow.  Brenda Danielson RN      ECOC on chart for MD signature

## 2018-07-19 NOTE — PROGRESS NOTES
Signs  Patient Currently in Pain: Yes  Pre Treatment Pain Screening  Intervention List: Patient able to continue with treatment    Orientation  Orientation  Overall Orientation Status: Within Normal Limits    Social/Functional History  Social/Functional History  Lives With: Spouse  Type of Home: House  Home Layout: One level  Home Access: Stairs to enter without rails  Entrance Stairs - Number of Steps: 1  Bathroom Shower/Tub: Tub/Shower unit  Bathroom Toilet: Standard  ADL Assistance: Independent  Homemaking Assistance: Independent  Homemaking Responsibilities: Yes (wife does cooking, laundry, shopping)  Cleaning Responsibility: Secondary  Ambulation Assistance: Independent  Transfer Assistance: Independent  Active : Yes  Occupation: Retired  Type of occupation: , professor at [employer]   Objective          PROM RLE (degrees)  RLE PROM: WNL  AROM RLE (degrees)  RLE AROM: WFL  RLE General AROM: lmited hip flexion due to pain in left abdomen  Strength RLE  Strength RLE: WNL  Comment: grossly 4+ to 5/5 throughout  Strength LLE  Comment: grossly 4+ to 5/5 throughout, except hip flexion not fully assessed due to pain, at least 3+/5 per obs        Bed mobility  Supine to Sit: Unable to assess (pt up in chair upon entry and at EOS)  Sit to Supine: Unable to assess (pt up in chair upon entry and at EOS)  Scooting: Supervision (to edge of chair)  Transfers  Sit to Stand: Supervision  Stand to sit: Supervision  Bed to Chair: Supervision  Ambulation  Ambulation?: Yes  Ambulation 1  Surface: level tile  Device: No Device  Assistance: Supervision (due to multiple lines and wall )  Quality of Gait: normal pace and ye, slightly wide VANE, steady with no LOB  Distance: 25 ft  Comments: distance limited due to multiple lines and wal suction  Stairs/Curb  Stairs?: No     Balance  Posture: Good  Sitting - Static: Good  Sitting - Dynamic: Good  Standing - Static: Good  Standing - Dynamic: Good;- Assessment   Body structures, Functions, Activity limitations: Decreased functional mobility ; Decreased ROM  Assessment: Pt referred for PT evaluation during current hospital stay with a diagnosis of bowel obstruction, s/p colectomy 7/18. Pt currently functioning below baseline, requiring supervision for transfers, and gait without AD. Gait limited by multiple lines and wall suction. Would like to see pt for 1-2 additional sessions to further assess gait and bed mobility. Recommend home with PRN assist at DC from acute setting  Treatment Diagnosis: decreased independence with mobility  Prognosis: Good  Decision Making: Medium Complexity  Patient Education: role of PT, splinting, importance of mobility  Barriers to Learning: no  REQUIRES PT FOLLOW UP: Yes  Activity Tolerance  Activity Tolerance: Patient Tolerated treatment well;Patient limited by pain  Activity Tolerance: limited by multiple lines and wall suction         Plan   Plan  Times per week: 1-2 additional sessions  Times per day: Daily  Current Treatment Recommendations: Strengthening, Gait Training, ROM, Stair training, Balance Training, Functional Mobility Training, Endurance Training, Transfer Training  Safety Devices  Type of devices: All fall risk precautions in place, Left in chair, Call light within reach, Nurse notified    G-Code  PT G-Codes  Functional Assessment Tool Used: AMPAC  Score: 21  Functional Limitation: Mobility: Walking and moving around  Mobility: Walking and Moving Around Current Status (): At least 20 percent but less than 40 percent impaired, limited or restricted  Mobility: Walking and Moving Around Goal Status ():  At least 1 percent but less than 20 percent impaired, limited or restricted    AM-PAC Score  AM-PAC Inpatient Mobility Raw Score : 21  AM-PAC Inpatient T-Scale Score : 50.25  Mobility Inpatient CMS 0-100% Score: 28.97  Mobility Inpatient CMS G-Code Modifier : CJ          Goals  Short term goals  Time

## 2018-07-19 NOTE — CONSULTS
Port (placed at [facility])   [address]  01/12/2017    gastric mass biopsy     UPPER GASTROINTESTINAL ENDOSCOPY  02/17/2017    UPPER GASTROINTESTINAL ENDOSCOPY  12/08/2017    duodenitis, gastritis    UPPER GASTROINTESTINAL ENDOSCOPY  05/13/2018       FAMILY HISTORY:    family history includes Heart Disease in his father. SOCIAL HISTORY:    Social History   Substance Use Topics    Smoking status: Never Smoker    Smokeless tobacco: Never Used    Alcohol use No       LABS:  WBC:    Lab Results   Component Value Date    WBC 16.7 07/19/2018     H/H:    Lab Results   Component Value Date    HGB 6.9 07/19/2018    HCT 21.0 07/19/2018     BMP:  Lab Results   Component Value Date     07/19/2018    K 4.2 07/19/2018    K 4.1 07/18/2018     07/19/2018    CO2 27 07/19/2018    BUN 21 07/19/2018    LABALBU 2.6 07/18/2018    CREATININE 0.7 07/19/2018    CALCIUM 8.6 07/19/2018    GFRAA >60 07/19/2018    GFRAA >60 08/18/2012    LABGLOM >60 07/19/2018    GLUCOSE 113 07/19/2018    GLUCOSE 109 04/27/2017     PTT:    Lab Results   Component Value Date    APTT 35.4 07/17/2018   [APTT}  PT/INR:    Lab Results   Component Value Date    PROTIME 14.9 07/18/2018    INR 1.31 07/18/2018       Objective  Lying in bed. Receiving blood transfusion. /78   Pulse 97   Temp 98.5 °F (36.9 °C)   Resp 18   Ht 5' 11\" (1.803 m)   Wt 153 lb 3.5 oz (69.5 kg)   SpO2 97%   BMI 21.37 kg/m²     Enrique Risk Score Enrique Scale Score: 20    Assessment  Stoma swollen and moist.  Erika stoma skin intact. Bridge in place   Surgeon Lamont    Colostomy      Colostomy (Active)   Stomal Appliance 2 piece 7/19/2018  6:00 PM   Flange Size (inches) 2.75 Inches 7/19/2018  6:00 PM   Stoma  Assessment Moist;Red;Swelling 7/19/2018  6:00 PM   Mucocutaneous Junction Intact 7/19/2018  6:00 PM   Peristomal Assessment Clean; Intact 7/19/2018  6:00 PM   Treatment Bag change;Site care; Heat applied 7/19/2018  6:00 PM   Stool Appearance Watery 7/19/2018  6:00 PM   Stool Color Red 7/19/2018  6:00 PM   Stool Amount Small 7/19/2018  6:00 PM   Output (mL) 50 ml 7/19/2018  6:00 PM   Number of days: 1       Intake/Output Summary (Last 24 hours) at 07/19/18 1815  Last data filed at 07/19/18 1800   Gross per 24 hour   Intake             2725 ml   Output             1400 ml   Net             1325 ml       Plan   Plan for Ostomy Care:  Written teaching instructions given to patient. Reviewed contents of the folder. Demonstrated how to empty bag and clean spout. Current bag soiled from draining from suture line. Instructed patient regarding appliance change. NO family present. Current bag removed. Clean chris stomal skin with warm water. Patted dry. Flange cut and placed over stoma. Bag connected. Ostomy care to continue to follow for teaching. Call for problems with seal [phone]. Change appliance 1-2 times a week. Empty and record when bag 1/3 to 1/2 full.     Ostomy Plan of Care  [x] Supplies/Instructions left in room  [] Patient using home supplies  [x] Brand/supplies at bedside db    Current Diet: Diet NPO Effective Now Exceptions are: Ice Chips  Dietician consult:  No    Discharge Plan:  Placement for patient upon discharge: skilled nursing    Outpatient visit plan No  Supplies given Yes   Samples requested No    Referrals:  [x]  will need f/u for new colostomy teaching  [x] [facility]  [] Supplies  [] Other      Patient/Caregiver Teaching:  Written Instructions given to patient/family - patient  Teaching provided:  [] Reviewed GI and A&P        [] Supplies  [x] Pouch emptying      [x] Manipulate closure  [x] Routine Care         [x] Comment bag change  [] Pouch maintenance           Level of patient/caregiver understanding able to:  [x] Indicates understanding       [x] Needs reinforcement  [] Unsuccessful      [] Verbal Understanding  [] Demonstrated understanding       [] No evidence of learning  [] Refused teaching         [] N/A    Electronically signed by Anai Nowak, RN, MSN, Mikey Mccracken on 7/19/2018 at 6:15 PM

## 2018-07-19 NOTE — PROGRESS NOTES
Hospitalist Progress Note      PCP: Irma Bravo MD    Date of Admission: 7/17/2018    Chief Complaint: abd pain     Hospital Course:     Subjective:     Pain controlled. No n/v. No chest pain, sob. No other new complaints. No edema       Medications:  Reviewed    Infusion Medications    lactated ringers 125 mL/hr at 07/19/18 0834    HYDROmorphone       Scheduled Medications    metroNIDAZOLE  500 mg Intravenous Q8H    ceFAZolin  1 g Intravenous Q8H    sodium chloride  250 mL Intravenous Once    pantoprazole  40 mg Intravenous BID    sodium chloride flush  10 mL Intravenous 2 times per day     PRN Meds: naloxone, benzocaine-menthol, sodium chloride flush, ondansetron      Intake/Output Summary (Last 24 hours) at 07/19/18 1124  Last data filed at 07/19/18 6725   Gross per 24 hour   Intake             1773 ml   Output              850 ml   Net              923 ml       Physical Exam Performed:    /69   Pulse 83   Temp 98 °F (36.7 °C) (Oral)   Resp 15   Ht 5' 11\" (1.803 m)   Wt 153 lb 3.5 oz (69.5 kg)   SpO2 92%   BMI 21.37 kg/m²     General appearance: No apparent distress, appears stated age and cooperative. HEENT: Pupils equal, round, and reactive to light. Conjunctivae/corneas clear. Neck: Supple, with full range of motion. No jugular venous distention  Respiratory:  Normal respiratory effort. Clear to auscultation, bilaterally without Rales/Wheezes/Rhonchi. Cardiovascular: Regular rate and rhythm with normal S1/S2 without murmurs, rubs or gallops. Abdomen: Soft, minimally tender with ostomy site pink, intact. Hyperactive bowel sounds. Musculoskeletal: No clubbing, cyanosis or edema bilaterally. Skin: Skin color, texture, turgor normal.  No rashes or lesions. Neurologic:  Neurovascularly intact without any focal sensory/motor deficits.  Cranial nerves: II-XII intact, grossly non-focal.  Psychiatric: Alert and oriented, thought content appropriate, normal insight  Capillary of hematemesis. 5. New ascites. 6. Stable splenomegaly with innumerable indeterminate hypodense lesions   measuring less than 1 cm. 7. Prostatomegaly. Assessment/Plan:    Active Hospital Problems    Diagnosis Date Noted    Colonic obstruction [K56.609] 07/17/2018    Malignant neoplasm of pancreas ([facility] [address] [C25.9]     Liver metastasis ([facility] [address] [C78.7] 01/12/2017    Malignant neoplasm of tail of pancreas ([facility] [address] [C25.2] 12/29/2016       79 Y M with a h/o metastatic pancreatic cancer had not had a bowel movement for 6 days, with progressive abdominal discomfort and nausea. This morning he vomited about 6 ounces of bright red blood, so he came to the ED.  found to have distal colonic obstruction 2/2 mass, now s/p resection      Distal colonic obstruction, due to 3.3 x 6.3 cm mass - now s/p open diverting loop colostomy, pelvic mass biopsy. at this point likely 2/2 metastatic pancreatic ca rather than new primary ca, follow up on pathology. - analgesia, antiemetics, IVFs  - surgery consult appreciate- postoperative pain control via PCA   - IS  - DVT ppx when able from a postoperative standpoint   - follow up pathology     Acute on chronic anemia - suspect 2/2 post surgical blood loss with baseline around 8 or so. May have chemotherapy related anemia as well   - continue PPI  - transfuse 2 units PRBCs on 7/19     Hematemesis   - baseline Hb fluctuates, but might be about 8. Monitor.    - he also had this in may and was found to have hemorrhagic gastritis. CT here showed extensive perigastric varices as well. continue PPI BID      Metastatic pancreatic cancer   - has been treated with chemo, last dose 7/4/18, also has completed radiation.   Oncology consulted, appreciate recs     Moderate malnutrition      DVT Prophylaxis: lovenox once stable postop - SCDs for now   Diet: Diet NPO Effective Now Exceptions are: Ice Chips  Code Status: Full Code    PT/OT Eval Status: consulted     Dispo - > 2 d inpatient     Sam Farias MD

## 2018-07-19 NOTE — PROGRESS NOTES
Occupational Therapy   Occupational Therapy Initial Assessment and Treatment Note  Date: 2018   Patient Name: Elizabeth Nguyen  MRN: [id number]     : 1950    Date of Service: 2018    Discharge Recommendations:  Home with assist PRN  OT Equipment Recommendations  Equipment Needed: No  Other: Pt reports can borrow shower chair and walker from family       Patient Diagnosis(es): The primary encounter diagnosis was Hematemesis with nausea. A diagnosis of Pancreatic cancer metastasized to liver [facility]) was also pertinent to this visit. has a past medical history of Abdominal pain; Anemia; Blood in stool; Cancer ([facility] [address] and History of blood transfusion. has a past surgical history that includes Colonoscopy (2016); Upper gastrointestinal endoscopy (2017); liver biopsy (2017); Upper gastrointestinal endoscopy (2017); Upper gastrointestinal endoscopy (2017); Tunneled venous port placement (); Upper gastrointestinal endoscopy (2018); colostomy (2018); and pr lap,surg,colect,tot,w/proctect,w/ileost (N/A, 2018). Restrictions  Restrictions/Precautions  Restrictions/Precautions: General Precautions  Position Activity Restriction  Other position/activity restrictions: up with assist     Subjective   General  Chart Reviewed: Yes  Patient assessed for rehabilitation services?: Yes  Additional Pertinent Hx: metastatic pancreatic cancer   Family / Caregiver Present: No  Referring Practitioner: Ilene Leos  Diagnosis: Distal colonic obstruction, s/p Colostomy 18  Subjective  Subjective: Pt up in chair on arrival, agreeable to eval and treat, but reporting increased pain with movement  General Comment  Comments: Per RN okay to treat  Pain Assessment  Patient Currently in Pain: Yes  Pain Assessment: 0-10  Pain Level: 5  Pain Type: Surgical pain  Pain Location: Abdomen  Pain Orientation: Left, Mid  Pain Descriptors:  Other (Comment) (fullness)  Pain Frequency: Continuous  Clinical Progression: Not changed  Patient's Stated Pain Goal: No pain  Pain Intervention(s): Ambulation/Increased activity, Repositioned, Distraction, Therapeutic presence     Social/Functional History  Social/Functional History  Lives With: Spouse  Type of Home: House  Home Layout: One level  Home Access: Stairs to enter without rails  Entrance Stairs - Number of Steps: 1  Bathroom Shower/Tub: Tub/Shower unit  Bathroom Toilet: Standard  ADL Assistance: Independent  Homemaking Assistance: Independent  Homemaking Responsibilities: Yes (wife does cooking, laundry, shopping)  Cleaning Responsibility: Secondary  Ambulation Assistance: Independent  Transfer Assistance: Independent  Active : Yes  Occupation: Retired  Type of occupation: , 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  Additional Comments: Pt's wife is retired and available for 24/7 supervision/assist at d/c       Objective   Vision: Impaired  Vision Exceptions: Wears glasses for reading  Hearing: Within functional limits    Orientation  Overall Orientation Status: Within Normal Limits     Balance  Sitting Balance: Supervision  Standing Balance: Supervision  Standing Balance  Time: 3-4 minutes  Activity: mobility   Sit to stand: Supervision  Stand to sit: Supervision  Functional Mobility  Functional - Mobility Device: No device  Activity: Other  Assist Level: Stand by assistance  ADL  LE Dressing: Supervision (reaching to feet to adjust socks)  Tone RUE  RUE Tone: Normotonic  Tone LUE  LUE Tone: Normotonic     Bed mobility  Supine to Sit: Unable to assess  Sit to Supine: Unable to assess  Comment: Pt up in chair on arrival and exit   Transfers  Sit to stand: Supervision  Stand to sit: Supervision     Cognition  Overall Cognitive Status: WFL        Sensation  Overall Sensation Status: Impaired (Pt reports numbness at fingertips and bottoms of feet;  Pt reports no accidental burns/cuts d/t numbness, reporting \"it doesn't impair me at all\")        LUE AROM (degrees)  LUE AROM : WFL  Left Hand AROM (degrees)  Left Hand AROM: WFL  RUE AROM (degrees)  RUE AROM : WFL  Right Hand AROM (degrees)  Right Hand AROM: WFL  LUE Strength  Gross LUE Strength: WFL  RUE Strength  Gross RUE Strength: WFL    Assessment   Performance deficits / Impairments: Decreased functional mobility ; Decreased balance;Decreased ADL status; Decreased high-level IADLs  Assessment: After evaluation, pt found to be presenting with the above mentioned occupational performance deficits which are affecting participation in daily living skills. Pt would benefit from continued skilled occupational therapy to address ADLs, functional mobility, and safety while in acute care. Prognosis: Good  Decision Making: Low Complexity  Patient Education: role of OT, safety, importance of mobility   REQUIRES OT FOLLOW UP: Yes  Activity Tolerance  Activity Tolerance: Patient limited by pain  Safety Devices  Safety Devices in place: Yes  Type of devices: Nurse notified; Chair alarm in place; Left in chair;Call light within reach         Plan   Plan  Times per week: 1-2 additional visits  Current Treatment Recommendations: Balance Training, Functional Mobility Training, Endurance Training, Patient/Caregiver Education & Training, Self-Care / ADL, Home Management Training, Equipment Evaluation, Education, & procurement, Safety Education & Training, Positioning, Pain Management    G-Code  OT G-codes  Functional Assessment Tool Used: AM-PAC   Score: 20  Functional Limitation: Self care  Self Care Current Status (): At least 20 percent but less than 40 percent impaired, limited or restricted  Self Care Goal Status ():  At least 1 percent but less than 20 percent impaired, limited or restricted    AM-PAC Score  AM-[facility] Inpatient Daily Activity Raw Score: 20  AM-PAC Inpatient ADL T-Scale Score : 42.03  ADL Inpatient CMS 0-100% Score: 38.32  ADL Inpatient CMS G-Code Modifier :

## 2018-07-20 PROBLEM — E44.0 MODERATE MALNUTRITION (HCC): Chronic | Status: ACTIVE | Noted: 2018-01-01

## 2018-07-20 NOTE — FLOWSHEET NOTE
Patient is hopeful the nasal/gastric tube can be removed so he can have an iced tea. He is hopeful and awaiting more tests. [place] is a part of his life. Offered a prayer. 07/20/18 6327   Encounter Summary   Services provided to: Patient   Referral/Consult From: Nurse   Continue Visiting Yes   Volunteer Visit (May need more support as test results come in. )   Complexity of Encounter Moderate   Length of Encounter 15 minutes   Spiritual Assessment Completed Yes   Routine   Type Follow up   Assessment Calm; Approachable; Hopeful;Coping   Intervention Active listening;Prayer;Explored feelings, thoughts, concerns;Explored coping resources   Outcome Comfort;Engaged in conversation;Expressed feelings/needs/concerns;Receptive   Sacraments   Communion Patient is currently unable  (Looking forward to communion when tube is removed.)    Expressed gratitude for the visit.

## 2018-07-20 NOTE — PROGRESS NOTES
patient and wife to practise this process on model. Both were able to demonstrate understanding with guided verbal assistance. Demonstrated how to empty colostomy bag. Instructed to practise with the nurse/PCA over the week end. Verbalized understanding. Wife anxious and asking many questions. Questions answered and verbalized understanding. Daughter in room and understands colostomy care and she was guiding mother as well. Praise and reassurance given to patient and wife. Will continue teaching and on next visit will change appliance on patient. Wife showed [address] education given to them regarding food restrictions. Explained that there is no food intake restrictions with colostomy, just ileostomy which he does not have. He needs to be aware of food that increase odor and if odor bothers him, he may want to avoid those foods for a while. Ostomy care to continue to follow for teaching. Ostomy Plan of Care  [x] Supplies/Instructions left in room bags, flange, paste and powder  [] Patient using home supplies  [x] Brand/supplies at bedside Lopez    Current Diet: Diet NPO Effective Now Exceptions are: Ice Chips  Dietician consult:  Yes - already following. Discharge Plan:  Placement for patient upon discharge: skilled nursing    Outpatient visit plan No  Supplies given Yes   Samples requested No    Referrals:  []  following  [] [facility]  [] Supplies  [] Other      Patient/Caregiver Teaching:  Written Instructions given to patient/family Contents of teaching folder, how to measure, cut, place flange on model, how to place bag on flange. Cleansing of skin, showering and using hir dryer to dry wafer, when to change appliance.   Teaching provided:  [x] Reviewed GI and A&P        [x] Supplies  [x] Pouch emptying      [x] Manipulate closure  [x] Routine Care         [] Comment  [x] Pouch maintenance           Level of patient/caregiver understanding able to:  [x] Indicates understanding       [x] Needs reinforcement  [] Unsuccessful      [] Verbal Understanding  [x] Demonstrated understanding       [] No evidence of learning  [] Refused teaching         [] N/A    2 hours spent with patient and wife today.     Electronically signed by Glenny Snell RN, MSN, Catarina Stevens on 7/20/2018 at 1:22 PM

## 2018-07-20 NOTE — PLAN OF CARE
Problem: Safety:  Intervention: Provide a safe environment  Room free from clutter with adequate lighting. Non-skid footwear on. Pt is A&O x 4, calls out appropriately. Will monitor.

## 2018-07-20 NOTE — PROGRESS NOTES
Hematology/Oncology Progress Note       Subjective:     He is POD 2 diverting colostomy. No longer nauseated. Feels better. Blood tinged output from NG. Minimal output from ostomy. Denies abdominal cramping     ROS:     Constitutional: Denies fever, sweats, weight loss. .     Eyes: No visual changes or diplopia. No scleral icterus. ENT: No Headaches, no hearing loss, no  vertigo. No mouth sores or sore throat. Cardiovascular: No chest pain, no dyspnea on exertion, no palpitations, no  loss of consciousness. Respiratory: No cough, no  wheezing, no dyspnea, no sputum production. No hemoptysis. .    Gastrointestinal: No abdominal pain, no appetite loss, no blood in stools. No change in bowel habits. Genitourinary: No dysuria, trouble voiding, or hematuria. Musculoskeletal:  Generalized weakness. No joint complaints. Integumentary: No rash or pruritis. Neurological: No headache, diplopia. No change in gait, balance, or coordination. No paresthesias. Endocrine: No temperature intolerance. No excessive thirst, fluid intake, or urination. Hematologic/Lymphatic: No abnormal bruising or ecchymoses, no blood clots or swollen lymph nodes. Allergic/Immunologic: No nasal congestion or hives. Objective:     Physical examination:     /69   Pulse 98   Temp 98.5 °F (36.9 °C) (Oral)   Resp 22   Ht 5' 11\" (1.803 m)   Wt 153 lb 3.5 oz (69.5 kg)   SpO2 93%   BMI 21.37 kg/m²     CONSTITUTIONAL: awake, alert, cooperative, no apparent distress. EYES:  Pupils equal, round and reactive to light, sclera non-icteric, conjunctiva normal  ENT:  Normocephalic, without obvious abnormality, atraumatic, sinuses nontender on palpation, external ears without lesions, oral pharynx with moist mucus membranes, no mucositis.  NG TUBE, dark brown/red output (scant)   NECK:  Supple, symmetrical, trachea midline, no adenopathy, thyroid symmetric, not enlarged and no tenderness, skin normal  HEMATOLOGIC/LYMPHATICS:  no cervical (DILAUDID) 10 mg/50 mL PCA, , Intravenous, Continuous  benzocaine-menthol (CEPACOL SORE THROAT) lozenge 1 lozenge, 1 lozenge, Oral, Q2H PRN  sodium chloride flush 0.9 % injection 10 mL, 10 mL, Intravenous, 2 times per day  sodium chloride flush 0.9 % injection 10 mL, 10 mL, Intravenous, PRN  ondansetron (ZOFRAN) injection 4 mg, 4 mg, Intravenous, Q6H PRN    Imaging:         Impression:     Pancreatic CA with extrinsic compression of the colon- biopsy with squamous cell     Assessment and Plan:     He has failed Folfirinox based on new pelvic mass causing large bowel obstruction. S/p diverting ostomy POD 1   Cont on continuous Low wall suction and PCA   2 units PRBC today, post ob blood loss   Foundation one testing has been sent- will take two weeks to get back. Cont supportive care, surgery following. No plans for TPN- NG out later today and possible PO diet tomorrow or later this afternoon.      Bradford Calix CNP   Medical Oncology/Hematology    [facility] - [address],  [place] Moreno [place] [address]  Phone: [phone]  Fax: [phone]

## 2018-07-20 NOTE — PROGRESS NOTES
Shift assessment complete as charted. Pt is A&O x 4. VSS. NG to CLWS, right nare, patent with serosanguineous output. Bowel sounds hypoactive. No flatus. Stoma moist and pink. Gonzalez catheter with clear elio urine. Per pt, pain well controlled via PCA pump. Needs addressed to pt satisfaction. Bed in low position with wheels locked and side rails up x 2. Call light and bedside table within reach. Will monitor.   Electronically signed by Harish Calderón RN on 7/19/2018 at 11:21 PM

## 2018-07-20 NOTE — PROGRESS NOTES
Hospitalist Progress Note      PCP: Marifer Santiago MD    Date of Admission: 7/17/2018    Chief Complaint: abd pain     Hospital Course:     Subjective:     Pain reasonably controlled except when he coughs. Has not been using IS correctly. Up working with PT/OT this morning. Wants iced tea. No chest pain, sob. LUE swelling new over last 24 hours       Medications:  Reviewed    Infusion Medications    lactated ringers 125 mL/hr at 07/20/18 0945    HYDROmorphone       Scheduled Medications    enoxaparin  40 mg Subcutaneous Daily    pantoprazole  40 mg Intravenous BID    sodium chloride flush  10 mL Intravenous 2 times per day     PRN Meds: naloxone, benzocaine-menthol, sodium chloride flush, ondansetron      Intake/Output Summary (Last 24 hours) at 07/20/18 1039  Last data filed at 07/20/18 0550   Gross per 24 hour   Intake          4154.93 ml   Output             1425 ml   Net          2729.93 ml       Physical Exam Performed:    BP (!) 153/83   Pulse 89   Temp 97.8 °F (36.6 °C) (Oral)   Resp 16   Ht 5' 11\" (1.803 m)   Wt 153 lb 3.5 oz (69.5 kg)   SpO2 92%   BMI 21.37 kg/m²     General appearance: No apparent distress, appears stated age and cooperative. NG in place   HEENT: Pupils equal, round, and reactive to light. Conjunctivae/corneas clear. Neck: Supple, with full range of motion. No jugular venous distention  Respiratory:  Normal respiratory effort. Clear to auscultation, bilaterally without Rales/Wheezes/Rhonchi. Cardiovascular: Regular rate and rhythm with normal S1/S2 without murmurs, rubs or gallops. Abdomen: Soft, minimally tender with ostomy site pink, mildly distended intact. Decreased BS   Musculoskeletal: No clubbing, cyanosis, LUE with nonpitting edema   Skin: Skin color, texture, turgor normal.  No rashes or lesions. Neurologic:  Neurovascularly intact without any focal sensory/motor deficits.  Cranial nerves: II-XII intact, grossly non-focal.  Psychiatric: Alert and oriented, thought content appropriate, normal insight  Capillary Refill: Brisk,< 3 seconds   Peripheral Pulses: +2 palpable, equal bilaterally       Labs:   Recent Labs      07/18/18   0523  07/19/18   0745  07/20/18   0713   WBC  17.7*  16.7*  13.3*   HGB  8.1*  6.9*  9.0*   HCT  24.8*  21.0*  26.2*   PLT  205  182  143     Recent Labs      07/18/18   0523  07/19/18   0745   NA  137  137   K  4.1  4.2   CL  105  104   CO2  25  27   BUN  16  21*   CREATININE  0.6*  0.7*   CALCIUM  8.5  8.6     Recent Labs      07/18/18   0523   AST  16   ALT  8*   BILITOT  0.4   ALKPHOS  248*     Recent Labs      07/18/18   0523   INR  1.31*     No results for input(s): CKTOTAL, TROPONINI in the last 72 hours. Urinalysis:      Lab Results   Component Value Date    NITRU Negative 12/07/2017    WBCUA 0-2 12/07/2017    BACTERIA Rare 12/07/2017    RBCUA 0-2 12/07/2017    BLOODU TRACE-LYSED 12/07/2017    SPECGRAV 1.020 12/07/2017    GLUCOSEU Negative 12/07/2017       Radiology:  XR ABDOMEN (KUB) (SINGLE AP VIEW)   Final Result   Stable colonic obstruction, with transition at the level of the sigmoid. XR Acute Abd Series Chest 1 VW   Final Result   1. No acute pulmonary process. 2. Colonic distention with the cecum measuring 10 cm. Findings which may   represent colitis or possible colonic ileus. 3. No evidence of perforation. CT ABDOMEN PELVIS W IV CONTRAST Additional Contrast? None   Final Result   1. Stable pancreatic tail 1.9 cm mass. 2. Significant progressive hepatic metastatic disease. 3. Associated with the proximal and distal sigmoid colon there is a   hyperdense 3.3 x 6.3 cm mass resulting in distal colonic obstruction with   moderate distention. This may represent primary colonic malignancy versus   peritoneal metastatic disease. 4. Extensive perigastric varices which likely relate to the patient's history   of hematemesis. 5. New ascites.    6. Stable splenomegaly with innumerable indeterminate hypodense Prophylaxis: lovenox ppx   Diet: Diet NPO Effective Now Exceptions are: Ice Chips  Code Status: Full Code    PT/OT Eval Status: consulted     Dispo - > 2 d inpatient     Sam Farias MD

## 2018-07-20 NOTE — PROGRESS NOTES
Nutrition Assessment    Type and Reason for Visit: Reassess    Nutrition Recommendations:   · Continue NPO with ice chips, advance diet as tolerated per MD  · Add Ensure Clear Mixed Berry when appropriate  · Monitor nutrition adequacy, weights, pertinent labs, BMs and clinical progress      Malnutrition Assessment:  · Malnutrition Status: Meets the criteria for moderate malnutrition  · Context: Chronic illness  · Findings of the 6 clinical characteristics of malnutrition (Minimum of 2 out of 6 clinical characteristics is required to make the diagnosis of moderate or severe Protein Calorie Malnutrition based on AND/ASPEN Guidelines):  1. Energy Intake-Less than or equal to 75%, greater than or equal to 3 months    2. Weight Loss-2% loss or greater, in 3 months  3. Fat Loss-No significant subcutaneous fat loss,    4. Muscle Loss-Mild muscle mass loss, Temples (temporalis muscle), Clavicles (pectoralis and deltoids)  5. Fluid Accumulation-No significant fluid accumulation,    6.  Strength-Not measured    Nutrition Diagnosis:   · Problem: Inadequate oral intake  · Etiology: related to Insufficient energy/nutrient consumption     Signs and symptoms:  as evidenced by Diet history of poor intake, Weight loss    Nutrition Assessment:  · Subjective Assessment: Follow up: Patient seen in room this date. Endorses decreased appetite and weight loss for the last few months. Continues NPO with ice chips ( day 3). NG clamped today. Will add Ensure Clear (Mixed Berry) when appropriate.   · Nutrition-Focused Physical Findings: 75 ml ostomy output x 24 hours  · Wound Type: Surgical Wound  · Current Nutrition Therapies:  · Oral Diet Orders: NPO   · Oral Diet intake: NPO  · Oral Nutrition Supplement (ONS) Orders: None  · ONS intake: NPO  · Anthropometric Measures:  · Ht: 5' 11\" (180.3 cm)   · Current Body Wt: 153 lb 3.5 oz (69.5 kg)  · % Weight Change: 4.3% loss,  Since May 2018 per EMR (~3 months)  · Ideal Body Wt: 172 lb (78 kg)   · BMI Classification: BMI 18.5 - 24.9 Normal Weight  · Comparative Standards (Estimated Nutrition Needs):  · Estimated Daily Total Kcal: 5094-5214 kcal  · Estimated Daily Protein (g):  gm    Estimated Intake vs Estimated Needs: Intake Less Than Needs    Nutrition Risk Level: High    Nutrition Interventions:   Start oral diet, Start ONS (Diet progression per MD)  Continued Inpatient Monitoring    Nutrition Evaluation:   · Evaluation: Goals set   · Goals: Pt will tolerate diet and have intakes 50% or greater this admission    · Monitoring: NPO Status, Diet Progression, Meal Intake, Supplement Intake, Diet Tolerance, Weight (bowel habits)    See Adult Nutrition Doc Flowsheet for more detail.      Electronically signed by Krystina Knox RD, MARIA ESTHER on 7/20/18 at 10:52 AM    Contact Number: [phone]

## 2018-07-21 NOTE — PROGRESS NOTES
Hospitalist Progress Note      PCP: Jewel Jha MD    Date of Admission: 7/17/2018    Chief Complaint: abd pain     Hospital Course:     Subjective:     Pain controlled, NG removed, pitts out. Ambulating well. No chest pain, sob. No stool yet but gas in bag. Medications:  Reviewed    Infusion Medications    lactated ringers 125 mL/hr at 07/21/18 4756     Scheduled Medications    enoxaparin  40 mg Subcutaneous Daily    pantoprazole  40 mg Intravenous BID    sodium chloride flush  10 mL Intravenous 2 times per day     PRN Meds: HYDROmorphone, benzocaine-menthol, sodium chloride flush, ondansetron      Intake/Output Summary (Last 24 hours) at 07/21/18 1037  Last data filed at 07/21/18 0439   Gross per 24 hour   Intake             2931 ml   Output             1050 ml   Net             1881 ml       Physical Exam Performed:    /73   Pulse 97   Temp 98.2 °F (36.8 °C) (Oral)   Resp 16   Ht 5' 11\" (1.803 m)   Wt 153 lb 3.5 oz (69.5 kg)   SpO2 92%   BMI 21.37 kg/m²     General appearance: No apparent distress, appears stated age and cooperative. HEENT: Pupils equal, round, and reactive to light. Neck: Supple, with full range of motion. No jugular venous distention  Respiratory:  Normal respiratory effort. Clear to auscultation, bilaterally without Rales/Wheezes/Rhonchi. Cardiovascular: Regular rate and rhythm with normal S1/S2 without murmurs, rubs or gallops. Abdomen: Soft, minimally tender with ostomy site pink, mildly distended intact. Decreased BS   Musculoskeletal: No clubbing, cyanosis, LUE with nonpitting edema increased from yesterday   Skin: Skin color, texture, turgor normal.  No rashes or lesions. Neurologic:  Neurovascularly intact without any focal sensory/motor deficits.  Cranial nerves: II-XII intact, grossly non-focal.  Psychiatric: Alert and oriented, thought content appropriate, normal insight  Peripheral Pulses: +2 palpable, equal bilaterally       Labs:   Recent Labs 07/19/18   0745  07/20/18   0713   WBC  16.7*  13.3*   HGB  6.9*  9.0*   HCT  21.0*  26.2*   PLT  182  143     Recent Labs      07/19/18   0745   NA  137   K  4.2   CL  104   CO2  27   BUN  21*   CREATININE  0.7*   CALCIUM  8.6     No results for input(s): AST, ALT, BILIDIR, BILITOT, ALKPHOS in the last 72 hours. No results for input(s): INR in the last 72 hours. No results for input(s): Shantell Nap in the last 72 hours. Urinalysis:      Lab Results   Component Value Date    NITRU Negative 12/07/2017    WBCUA 0-2 12/07/2017    BACTERIA Rare 12/07/2017    RBCUA 0-2 12/07/2017    BLOODU TRACE-LYSED 12/07/2017    SPECGRAV 1.020 12/07/2017    GLUCOSEU Negative 12/07/2017       Radiology:  VL Extremity Venous Left   Final Result      XR ABDOMEN (KUB) (SINGLE AP VIEW)   Final Result   Stable colonic obstruction, with transition at the level of the sigmoid. XR Acute Abd Series Chest 1 VW   Final Result   1. No acute pulmonary process. 2. Colonic distention with the cecum measuring 10 cm. Findings which may   represent colitis or possible colonic ileus. 3. No evidence of perforation. CT ABDOMEN PELVIS W IV CONTRAST Additional Contrast? None   Final Result   1. Stable pancreatic tail 1.9 cm mass. 2. Significant progressive hepatic metastatic disease. 3. Associated with the proximal and distal sigmoid colon there is a   hyperdense 3.3 x 6.3 cm mass resulting in distal colonic obstruction with   moderate distention. This may represent primary colonic malignancy versus   peritoneal metastatic disease. 4. Extensive perigastric varices which likely relate to the patient's history   of hematemesis. 5. New ascites. 6. Stable splenomegaly with innumerable indeterminate hypodense lesions   measuring less than 1 cm. 7. Prostatomegaly.                  Assessment/Plan:    Active Hospital Problems    Diagnosis Date Noted    Moderate malnutrition ([address] [E44.0] 07/20/2018    Colonic obstruction [K56.609] 07/17/2018    Malignant neoplasm of pancreas ([facility] [address] [C25.9]     Liver metastasis ([facility] [C78.7] 01/12/2017    Malignant neoplasm of tail of pancreas ([facility] [address] [C25.2] 12/29/2016       79 Y M with a h/o metastatic pancreatic cancer had not had a bowel movement for 6 days, with progressive abdominal discomfort and nausea. This morning he vomited about 6 ounces of bright red blood, so he came to the ED.  found to have distal colonic obstruction 2/2 mass, now s/p resection      Distal colonic obstruction, due to 3.3 x 6.3 cm mass - now s/p open diverting loop colostomy, pelvic mass biopsy. at this point likely 2/2 metastatic pancreatic ca rather than new primary ca, follow up on pathology. - analgesia, antiemetics, IVFs  - surgery consult appreciate- postoperative pain control via PCA   - IS  - DVT ppx when able from a postoperative standpoint   - reviewed pathology and imaging, most likely progressive disease     Acute on chronic anemia - suspect 2/2 post surgical blood loss with baseline around 8 or so. May have chemotherapy related anemia as well. Improved s/p transfusion 7/19    - continue PPI  - serial H/H     LUE edema 2/2 superficial thrombosis - no DVT, continue with elevation, compression, heat packs. Continue DVT ppx      Hematemesis   - baseline Hb fluctuates, but might be about 8. Monitor.    - he also had this in may and was found to have hemorrhagic gastritis. CT here showed extensive perigastric varices as well. continue PPI BID      Metastatic pancreatic cancer   - has been treated with chemo, last dose 7/4/18, also has completed radiation.   Oncology consulted, appreciate recs   - requested they review his pathology     Moderate malnutrition      DVT Prophylaxis: lovenox ppx   Diet: DIET CLEAR LIQUID;  Code Status: Full Code    PT/OT Eval Status: consulted     Dispo - > 2 d inpatient pending further clinical progress     Oli Henley MD

## 2018-07-21 NOTE — PROGRESS NOTES
Shift assessment complete as charted. VSS. LUE edematous, elevated on pillow. Flatus in colostomy bag. Denies nausea. Tolerating PO fluids. Continues with PCA pump for pain. Lungs are CTA in all lobes. BS hypoactive x 4. Urinating without difficulty. Needs addressed to pt satisfaction. Bed in low position with wheels locked and SR 2/4. Call light and bedside table within reach. Will monitor.   Electronically signed by Chantelle Armando RN on 7/21/2018 at 3:36 AM

## 2018-07-21 NOTE — PROGRESS NOTES
Occupational Therapy  Facility/Department: [facility] C3 TELE/MED SURG/ONC  Daily Treatment Note  NAME: Fatoumata Ding  : 1950  MRN: [id number]    Date of Service: 2018    Discharge Recommendations:  Home with assist PRN  OT Equipment Recommendations  Other: Pt reports can borrow shower chair and walker from family     Patient Diagnosis(es): The primary encounter diagnosis was Hematemesis with nausea. A diagnosis of Pancreatic cancer metastasized to liver [facility]) was also pertinent to this visit. has a past medical history of Abdominal pain; Anemia; Blood in stool; Cancer ([facility] [address] and History of blood transfusion. has a past surgical history that includes Colonoscopy (2016); Upper gastrointestinal endoscopy (2017); liver biopsy (2017); Upper gastrointestinal endoscopy (2017); Upper gastrointestinal endoscopy (2017); Tunneled venous port placement (); Upper gastrointestinal endoscopy (2018); colostomy (2018); and pr lap,surg,colect,tot,w/proctect,w/ileost (N/A, 2018). Restrictions  Restrictions/Precautions  Restrictions/Precautions: General Precautions  Position Activity Restriction  Other position/activity restrictions: up with assist   Subjective   General  Chart Reviewed: Yes  Patient assessed for rehabilitation services?: Yes  Additional Pertinent Hx: metastatic pancreatic cancer   Family / Caregiver Present: No  Referring Practitioner:  Artemio Boyle  Diagnosis: Distal colonic obstruction, s/p Colostomy 18  Subjective  Subjective: Pt semi-supine in bed upon arrival agreeable to session  General Comment  Comments: Per RN okay to treat  Pain Assessment  Patient Currently in Pain: Denies  Vital Signs  Patient Currently in Pain: Denies   Orientation  Orientation  Overall Orientation Status: Within Functional Limits  Objective    ADL  LE Dressing: Independent (doff/don socks EOB)        Balance  Sitting Balance: Independent  Standing Balance: Supervision  Bed mobility  Supine to Sit: Modified independent  Sit to Supine: Modified independent  Scooting: Modified independent  Comment: HOB elevated  Transfers  Stand Step Transfers: Stand by assistance (no AD)  Transfer Comments: SBA for safety, VCs to manage lines                       Cognition  Overall Cognitive Status: WFL     Perception  Overall Perceptual Status: WFL     Assessment   Performance deficits / Impairments: Decreased functional mobility ; Decreased balance;Decreased ADL status; Decreased high-level IADLs  Assessment: Pt Independent LB dressing EOB, SBA stand step t/f EOB<>chair with no AD, SBA for safety and to manage lines, continue POC  Prognosis: Good  Decision Making: Low Complexity  Patient Education: role of OT, safety, importance of mobility   REQUIRES OT FOLLOW UP: Yes  Activity Tolerance  Activity Tolerance: Patient Tolerated treatment well  Safety Devices  Safety Devices in place: Yes  Type of devices: All fall risk precautions in place;Call light within reach; Left in bed;Nurse notified          Plan   Plan  Times per week: 3-5x/wk (pt requesting increased therapy while in hospital)  Current Treatment Recommendations: Balance Training, Functional Mobility Training, Endurance Training, Patient/Caregiver Education & Training, Self-Care / ADL, Home Management Training, Equipment Evaluation, Education, & procurement, Safety Education & Training, Positioning, Pain Management  G-Code  OT G-codes  Functional Assessment Tool Used: [facility]  Score: 22  Functional Limitation: Self care  Self Care Current Status ():  At least 20 percent but less than 40 percent impaired, limited or restricted    AM-PAC Score   AM-PAC Inpatient Daily Activity Raw Score: 22  AM-PAC Inpatient ADL T-Scale Score : 47.1  ADL Inpatient CMS 0-100% Score: 25.8  ADL Inpatient CMS G-Code Modifier : CJ    Goals  Short term goals  Time Frame for Short term goals: 1-2 additional visits  Short term goal 1: Pt will complete

## 2018-07-21 NOTE — PROGRESS NOTES
Wasted 7mL plus tubing of PCA dilaudid with Leonora CROWELL.  Electronically signed by Shanice Toth RN on 7/21/2018 at 11:16 AM

## 2018-07-22 NOTE — PROGRESS NOTES
appropriate, normal insight  Peripheral Pulses: +2 palpable, equal bilaterally       Labs:   Recent Labs      07/20/18   0713  07/22/18   0510   WBC  13.3*  11.9*   HGB  9.0*  8.7*   HCT  26.2*  26.4*   PLT  143  129*     Recent Labs      07/21/18   1200  07/22/18   0510   NA  139  138   K  4.0  3.7   CL  104  103   CO2  24  27   BUN  21*  17   CREATININE  0.6*  <0.5*   CALCIUM  8.8  8.5   PHOS   --   1.9*     No results for input(s): AST, ALT, BILIDIR, BILITOT, ALKPHOS in the last 72 hours. No results for input(s): INR in the last 72 hours. No results for input(s): Kem Imus in the last 72 hours. Urinalysis:      Lab Results   Component Value Date    NITRU Negative 12/07/2017    WBCUA 0-2 12/07/2017    BACTERIA Rare 12/07/2017    RBCUA 0-2 12/07/2017    BLOODU TRACE-LYSED 12/07/2017    SPECGRAV 1.020 12/07/2017    GLUCOSEU Negative 12/07/2017       Radiology:  VL Extremity Venous Left   Final Result      XR ABDOMEN (KUB) (SINGLE AP VIEW)   Final Result   Stable colonic obstruction, with transition at the level of the sigmoid. XR Acute Abd Series Chest 1 VW   Final Result   1. No acute pulmonary process. 2. Colonic distention with the cecum measuring 10 cm. Findings which may   represent colitis or possible colonic ileus. 3. No evidence of perforation. CT ABDOMEN PELVIS W IV CONTRAST Additional Contrast? None   Final Result   1. Stable pancreatic tail 1.9 cm mass. 2. Significant progressive hepatic metastatic disease. 3. Associated with the proximal and distal sigmoid colon there is a   hyperdense 3.3 x 6.3 cm mass resulting in distal colonic obstruction with   moderate distention. This may represent primary colonic malignancy versus   peritoneal metastatic disease. 4. Extensive perigastric varices which likely relate to the patient's history   of hematemesis. 5. New ascites.    6. Stable splenomegaly with innumerable indeterminate hypodense lesions   measuring less than 1

## 2018-07-23 NOTE — PROGRESS NOTES
Ostomy Referral Follow-up Progress Note      NAME:  Chela Chopra  MEDICAL RECORD NUMBER:  [id number]  AGE: 79 y.o. GENDER:  male  :  1950  TODAY'S DATE:  2018    Subjective: \"The MD took the bridge out this morning\"    Chela Chopra is a 79 y.o. male referred by:   [x] Physician  [] Nursing  [] Other:     HX:  Sigmoid Colon Obstruction, Metastatic Pancreatic Cancer.  Laparoscopic Converted to Open Diverting Loop Colostomy, Mobilization of Splenic Flexure, Biopsy of Pelvic Mass by Dr Orlando Weldon on 18.     Stoma size: 2 inch = 51 mm  Flange size: 2 3/4 inch 2 piece flat flange # [id number] with lock and roll bag # [id number].        Objective  In Bed with wife at bed side. NG out, diet advanced to regular. IV's off. Reports getting up to chair. /82   Pulse 86   Temp 98.4 °F (36.9 °C) (Oral)   Resp 18   Ht 5' 11\" (1.803 m)   Wt 153 lb 3.5 oz (69.5 kg)   SpO2 93%   BMI 21.37 kg/m²     Enrique Risk Score Enrique Scale Score: 19    Assessment  Stoma a little less swollen today. Stool dark green. Erika stomal skin intact. Surgeon Orlando Weldon    Colostomy      Colostomy (Active)   Stomal Appliance 2 piece 2018 11:00 AM   Flange Size (inches) 2.75 Inches 2018 11:00 AM   Stoma  Assessment Red;Moist;Swelling 2018 11:00 AM   Mucocutaneous Junction Intact 2018 11:00 AM   Peristomal Assessment Clean; Intact 2018 11:00 AM   Treatment Bag change;Site care; Heat applied 2018 11:00 AM   Stool Appearance Watery 2018 11:00 AM   Stool Color Green 2018 11:00 AM   Stool Amount Small 2018 11:00 AM   Output (mL) 50 ml 2018 11:00 AM   Number of days: 4       Intake/Output Summary (Last 24 hours) at 18 1116  Last data filed at 18 1100   Gross per 24 hour   Intake              210 ml   Output             1825 ml   Net            -1615 ml       Plan:   MD was in earlier today and remove bridge from loop colostomy.     Plan for Ostomy Care:  Teaching with

## 2018-07-23 NOTE — PROGRESS NOTES
mg, Intravenous, BID  benzocaine-menthol (CEPACOL SORE THROAT) lozenge 1 lozenge, 1 lozenge, Oral, Q2H PRN  sodium chloride flush 0.9 % injection 10 mL, 10 mL, Intravenous, 2 times per day  sodium chloride flush 0.9 % injection 10 mL, 10 mL, Intravenous, PRN  ondansetron (ZOFRAN) injection 4 mg, 4 mg, Intravenous, Q6H PRN    Imaging:         Impression:     Pancreatic CA with extrinsic compression of the colon- biopsy with squamous cell     Assessment and Plan:     He has failed Folfirinox based on new pelvic mass causing large bowel obstruction. S/p diverting ostomy POD 5  Foundation one testing has been sent- will take two weeks to get back. Cont supportive care, surgery following. Hopefully will dc soon.      Luana Cardenas CNP   Medical Oncology/Hematology    [facility] - [address],  [place]   Phone: [phone]  Fax: [phone]

## 2018-07-23 NOTE — PROGRESS NOTES
07/22/18   0510   WBC  11.9*   HGB  8.7*   HCT  26.4*   PLT  129*     Recent Labs      07/21/18   1200  07/22/18   0510  07/23/18   0513   NA  139  138  135*   K  4.0  3.7  3.7   CL  104  103  103   CO2  24  27  25   BUN  21*  17  11   CREATININE  0.6*  <0.5*  <0.5*   CALCIUM  8.8  8.5  8.3   PHOS   --   1.9*   --      No results for input(s): AST, ALT, BILIDIR, BILITOT, ALKPHOS in the last 72 hours. No results for input(s): INR in the last 72 hours. No results for input(s): Kimberlyn Nap in the last 72 hours. Urinalysis:      Lab Results   Component Value Date    NITRU Negative 12/07/2017    WBCUA 0-2 12/07/2017    BACTERIA Rare 12/07/2017    RBCUA 0-2 12/07/2017    BLOODU TRACE-LYSED 12/07/2017    SPECGRAV 1.020 12/07/2017    GLUCOSEU Negative 12/07/2017       Radiology:  VL Extremity Venous Left   Final Result      XR ABDOMEN (KUB) (SINGLE AP VIEW)   Final Result   Stable colonic obstruction, with transition at the level of the sigmoid. XR Acute Abd Series Chest 1 VW   Final Result   1. No acute pulmonary process. 2. Colonic distention with the cecum measuring 10 cm. Findings which may   represent colitis or possible colonic ileus. 3. No evidence of perforation. CT ABDOMEN PELVIS W IV CONTRAST Additional Contrast? None   Final Result   1. Stable pancreatic tail 1.9 cm mass. 2. Significant progressive hepatic metastatic disease. 3. Associated with the proximal and distal sigmoid colon there is a   hyperdense 3.3 x 6.3 cm mass resulting in distal colonic obstruction with   moderate distention. This may represent primary colonic malignancy versus   peritoneal metastatic disease. 4. Extensive perigastric varices which likely relate to the patient's history   of hematemesis. 5. New ascites. 6. Stable splenomegaly with innumerable indeterminate hypodense lesions   measuring less than 1 cm. 7. Prostatomegaly.                  Assessment/Plan:    Active Hospital Problems Status: Full Code    PT/OT Eval Status: consulted     Dispo -  Possibly stable for d/c tomorrow with Mendoza Gleason MD

## 2018-07-23 NOTE — CARE COORDINATION
Chart reviewed. Day 6. New ostomy. Has output, tolerating diet. Hem onc following. [facility] for [facility]omy needs.  Cas Mercedes RN

## 2018-07-23 NOTE — PROGRESS NOTES
[facility] GENERAL SURGERY    Surgery Progress Note           POD # 5    PATIENT NAME: Portia Smith     TODAY'S DATE: 7/23/2018    INTERVAL HISTORY:    Doing well, having ostomy output - no nausea, anthony liquids. OBJECTIVE:   VITALS:  /83   Pulse 81   Temp 98.4 °F (36.9 °C) (Oral)   Resp 18   Ht 5' 11\" (1.803 m)   Wt 153 lb 3.5 oz (69.5 kg)   SpO2 91%   BMI 21.37 kg/m²     INTAKE/OUTPUT:    I/O last 3 completed shifts: In: 200 [P.O.:180; I.V.:20]  Out: 1450 [Urine:1350; Stool:100]  I/O this shift:  In: 610 [P.O.:600; I.V.:10]  Out: 550 [Urine:500; Stool:50]              CONSTITUTIONAL:  awake and alert  LUNGS:  No crackles or wheezes  ABDOMEN:   normal bowel sounds, soft, non-distended, mildly tender, stoma healthy - less edematous, bar removed without difficulty with gas and stool in bag   INCISION: no drainage    Data:  CBC:   Recent Labs      07/22/18   0510   WBC  11.9*   HGB  8.7*   HCT  26.4*   PLT  129*     BMP:    Recent Labs      07/21/18   1200  07/22/18   0510  07/23/18   0513   NA  139  138  135*   K  4.0  3.7  3.7   CL  104  103  103   CO2  24  27  25   BUN  21*  17  11   CREATININE  0.6*  <0.5*  <0.5*   GLUCOSE  122*  101*  109*     Hepatic: No results for input(s): AST, ALT, ALB, BILITOT, ALKPHOS in the last 72 hours. Mag:      Recent Labs      07/22/18   0510   MG  1.90      Phos:     Recent Labs      07/22/18   0510   PHOS  1.9*          ASSESSMENT AND PLAN:  79 y.o. male status post diverting loop colostomy, pelvic mass biopsy  GI: advance to reg diet. Ambulate in halls. New ostomy: WOCN following for teaching. Pierre Ochoa     Patient seen and agree with above.     Edson Bryson MD

## 2018-07-24 NOTE — PROGRESS NOTES
adenopathy, thyroid symmetric, not enlarged and no tenderness, skin normal  HEMATOLOGIC/LYMPHATICS:  no cervical lymphadenopathy, no supraclavicular lymphadenopathy, no axillary lymphadenopathy and no inguinal lymphadenopathy  BACK:  Symmetric, no curvature, spinous processes are non-tender on palpation, paraspinous muscles are non-tender on palpation, no costal vertebral tenderness  LUNGS:  Clear to auscultation bilaterally, no crackles or wheezing  CARDIOVASCULAR:  Regular rate and rhythm, normal S1 and S2, no S3 or S4, and no murmur noted  ABDOMEN:  hypo bowel sounds, soft, non-distended, non-tender, no masses palpated, no hepato-splenomegally, ostomy presnt   MUSCULOSKELETAL:  The left upper extremity is moderately swollen. NEUROLOGIC:   No focal findings. SKIN:  Scattered bruising and ecchymoses. EXT: without clubbing, cyanosis or edema.     Data:     CBC:   Recent Labs      07/22/18   0510  07/20/18   0713  07/19/18   0745   WBC  11.9*  13.3*  16.7*   HGB  8.7*  9.0*  6.9*   HCT  26.4*  26.2*  21.0*   MCV  90.3  89.4  91.1   PLT  129*  143  182       BMP:   Lab Results   Component Value Date     07/24/2018    K 3.7 07/24/2018    CO2 24 07/24/2018    BUN 10 07/24/2018    CREATININE <0.5 07/24/2018    CALCIUM 8.7 07/24/2018    MG 1.90 07/22/2018       HEPATIC:  Lab Results   Component Value Date    AST 16 07/18/2018    ALT 8 07/18/2018    ALKPHOS 248 07/18/2018    PROT 5.2 07/18/2018    PROT 5.5 04/27/2017    BILITOT 0.4 07/18/2018       TUMOR MARKERS:   Lab Results   Component Value Date     1995 07/17/2018       LDH:  No results found for: LDH    PT/INR:  No results found for: PTINR    PTT:  Lab Results   Component Value Date    APTT 35.4 07/17/2018       Current Medications:     Current Facility-Administered Medications: oxyCODONE (ROXICODONE) immediate release tablet 5 mg, 5 mg, Oral, Q4H PRN **OR** oxyCODONE (ROXICODONE) immediate release tablet 10 mg, 10 mg, Oral, Q4H PRN  HYDROmorphone (DILAUDID) injection 1 mg, 1 mg, Intravenous, Q2H PRN  enoxaparin (LOVENOX) injection 40 mg, 40 mg, Subcutaneous, Daily  pantoprazole (PROTONIX) injection 40 mg, 40 mg, Intravenous, BID  benzocaine-menthol (CEPACOL SORE THROAT) lozenge 1 lozenge, 1 lozenge, Oral, Q2H PRN  sodium chloride flush 0.9 % injection 10 mL, 10 mL, Intravenous, 2 times per day  sodium chloride flush 0.9 % injection 10 mL, 10 mL, Intravenous, PRN  ondansetron (ZOFRAN) injection 4 mg, 4 mg, Intravenous, Q6H PRN    Imaging:         Impression:     Pancreatic CA with extrinsic compression of the colon- biopsy with squamous cell     Assessment and Plan:     Pancreatic carcinoma:  -His initial biopsy was poorly differentiated, but felt to be an adenocarcinoma.  -The biopsy of his pelvic mass was read as a squamous cell carcinoma. I will discuss this with pathology. I suspect that both are the same disease, but it is so poorly differentiated that the pathology may very. I will see if they can get his old slides to compare.  -Interestingly, for someone with pancreatic carcinoma, he has had a very good response particularly to the initial chemotherapy.  -He initially failed gemcitabine/Abraxane and fell [address] one testing has been sent to determine his next treatment. This may take about 2 weeks to return. Line-I did discuss this with the patient and his wife. Superficial venous thrombosis:  -He is on Lovenox  -This is likely secondary to both his cancer and his port. -We typically do not give long-term anticoagulation for this, but he does have a higher risk of a DVT due to his recent surgery, superficial DVT, and history of pancreatic carcinoma.     Pain control:  -He is doing very well with his pain control  -He will likely be able to go home on oral medications    Kacey Burr MD  May be reached through [address]  [address], [address] [place] [address]  Phone:

## 2018-07-24 NOTE — PROGRESS NOTES
Ostomy Referral Follow-up Progress Note      NAME:  Elizabeth Nguyen  MEDICAL RECORD NUMBER:  [id number]  AGE: 79 y.o. GENDER:  male  :  1950  TODAY'S DATE:  2018    Subjective:  \"My doctors said I can go home today\"    Elizabeth Nguyen is a 79 y.o. male referred by:   [x] Physician  [] Nursing  [] Other:     HX:  Sigmoid Colon Obstruction, Metastatic Pancreatic Cancer.  Laparoscopic Converted to Open Diverting Loop Colostomy, Mobilization of Splenic Flexure, Biopsy of Pelvic Mass by Dr Fay Singh on 18.     Stoma size: 2 inch = 51 mm  Flange size: 2 3/4 inch 2 piece flat flange # [id number] with lock and roll bag # [id number].      Objective  Sitting up in bed. Wife at bed side    /81   Pulse 77   Temp 97.8 °F (36.6 °C) (Oral)   Resp 16   Ht 5' 11\" (1.803 m)   Wt 153 lb 3.5 oz (69.5 kg)   SpO2 93%   BMI 21.37 kg/m²     Enrique Risk Score Enrique Scale Score: 19    Assessment  Current flange and bag with secure seal   Surgeon Lamont    Colostomy      Colostomy (Active)   Stomal Appliance 2 piece 2018  2:30 PM   Flange Size (inches) 2.75 Inches 2018  2:30 PM   Stoma  Assessment Moist;Red 2018  2:30 PM   Mucocutaneous Junction Intact 2018  2:30 PM   Peristomal Assessment EDMUND 2018  2:30 PM   Treatment Other (Comment) 2018  2:30 PM   Stool Appearance Watery 2018  2:30 PM   Stool Color Brown 2018  2:30 PM   Stool Amount Small 2018  2:30 PM   Output (mL) 75 ml 2018  2:30 PM   Number of days: 6       Intake/Output Summary (Last 24 hours) at 18 1440  Last data filed at 18 1430   Gross per 24 hour   Intake              980 ml   Output             1265 ml   Net             -285 ml       Plan:   Plan for Ostomy Care:  Catalog marked for supplies. Instructed patient and wife how to order supplies. Patient walked into bathroom. Demonstrated understanding by emptying colostomy pouch on his own with stand by instructions.   Able to clean spout

## 2018-07-24 NOTE — PROGRESS NOTES
Hospitalist Progress Note      PCP: Honey Giraldo MD    Date of Admission: 7/17/2018    Chief Complaint: abd pain     Hospital Course:     Subjective:     Urinating frequently and had some dysuria this morning, UA unremarkable. No chest pain, sob. Pain controlled. No n/v. Tolerating PO       Medications:  Reviewed    Infusion Medications     Scheduled Medications    enoxaparin  40 mg Subcutaneous Daily    pantoprazole  40 mg Intravenous BID    sodium chloride flush  10 mL Intravenous 2 times per day     PRN Meds: oxyCODONE **OR** oxyCODONE, HYDROmorphone, benzocaine-menthol, sodium chloride flush, ondansetron      Intake/Output Summary (Last 24 hours) at 07/24/18 1305  Last data filed at 07/24/18 0716   Gross per 24 hour   Intake              980 ml   Output             1190 ml   Net             -210 ml       Physical Exam Performed:    /81   Pulse 77   Temp 97.8 °F (36.6 °C) (Oral)   Resp 16   Ht 5' 11\" (1.803 m)   Wt 153 lb 3.5 oz (69.5 kg)   SpO2 93%   BMI 21.37 kg/m²     General appearance: No apparent distress, appears stated age and cooperative. Anxious today   HEENT: Pupils equal, round, and reactive to light. Neck: Supple, with full range of motion. No jugular venous distention  Respiratory:  Normal respiratory effort. Clear to auscultation, bilaterally without Rales/Wheezes/Rhonchi. Cardiovascular: Regular rate and rhythm with normal S1/S2 without murmurs, rubs or gallops. Abdomen: Soft, minimally tender with ostomy site pink, mildly distended intact. Scrotal edema present, nontender to palpation. Improved. Musculoskeletal: No clubbing, cyanosis, LUE edema improved. 1+ edema bilateral LE. Skin: Skin color, texture, turgor normal.  No rashes or lesions. Neurologic:  Neurovascularly intact without any focal sensory/motor deficits.  Cranial nerves: II-XII intact, grossly non-focal.  Psychiatric: Alert and oriented, thought content appropriate, normal insight  Peripheral Pulses: Assessment/Plan:    Active Hospital Problems    Diagnosis Date Noted    Moderate malnutrition ([facility] [E44.0] 07/20/2018    Colonic obstruction [K56.609] 07/17/2018    Malignant neoplasm of pancreas (HCC) [C25.9]     Liver metastasis ([facility] [C78.7] 01/12/2017    [facility] [C25.2] 12/29/2016       79 Y M with a h/o metastatic pancreatic cancer had not had a bowel movement for 6 days, with progressive abdominal discomfort and nausea. This morning he vomited about 6 ounces of bright red blood, so he came to the ED.  found to have distal colonic obstruction 2/2 mass, now s/p resection      Distal colonic obstruction, due to 3.3 x 6.3 cm mass - now s/p open diverting loop colostomy, pelvic mass biopsy. at this point likely 2/2 metastatic pancreatic ca rather than new primary ca, hem/onc following   - analgesia, antiemetics  - d/c IVF due to worsening scrotal edema/ LE edema and improved PO intake   - surgery consult appreciate- postoperative pain control via PCA, now transitioned to boluses   - IS  - reviewed pathology and imaging, most likely progressive disease, further testing pending   - advancing diet as tolerated     Acute on chronic anemia - suspect 2/2 post surgical blood loss with baseline around 8 or so. May have chemotherapy related anemia as well. Improved s/p transfusion 7/19    - continue PPI  - serial H/H     LUE edema 2/2 superficial thrombosis - no DVT, continue with elevation, compression, heat packs. Continue DVT ppx      Hematemesis   - baseline Hb fluctuates, but might be about 8. Monitor.    - he also had this in may and was found to have hemorrhagic gastritis. CT here showed extensive perigastric varices as well. continue PPI BID      Metastatic pancreatic cancer   - has been treated with chemo, last dose 7/4/18, also has completed radiation.   Oncology consulted, appreciate recs   - requested they review his pathology     Hypophos - replaced IV

## 2018-07-24 NOTE — PROGRESS NOTES
[facility] GENERAL SURGERY    Surgery Progress Note           POD # 6    PATIENT NAME: Denver Palomares     TODAY'S DATE: 7/24/2018    INTERVAL HISTORY:    Continues to do well - anthony reg diet, ostomy functioning. More mobile      OBJECTIVE:   VITALS:  /81   Pulse 77   Temp 97.8 °F (36.6 °C) (Oral)   Resp 16   Ht 5' 11\" (1.803 m)   Wt 153 lb 3.5 oz (69.5 kg)   SpO2 93%   BMI 21.37 kg/m²     INTAKE/OUTPUT:    I/O last 3 completed shifts: In: 1590 [P.O.:1560; I.V.:30]  Out: 1590 [Urine:1540; Stool:50]  I/O this shift:  In: -   Out: 150 [Urine:150]              CONSTITUTIONAL:  awake and alert  LUNGS:  No crackles or wheezes  ABDOMEN:   normal bowel sounds, soft, non-distended, mildly tender, stoma healthy - with stool and gas in bag  INCISION: no drainage    Data:  CBC:   Recent Labs      07/22/18   0510   WBC  11.9*   HGB  8.7*   HCT  26.4*   PLT  129*     BMP:    Recent Labs      07/22/18   0510  07/23/18   0513  07/24/18   0546   NA  138  135*  133*   K  3.7  3.7  3.7   CL  103  103  100   CO2  27  25  24   BUN  17  11  10   CREATININE  <0.5*  <0.5*  <0.5*   GLUCOSE  101*  109*  92     Hepatic: No results for input(s): AST, ALT, ALB, BILITOT, ALKPHOS in the last 72 hours. Mag:      Recent Labs      07/22/18   0510   MG  1.90      Phos:     Recent Labs      07/22/18   0510   PHOS  1.9*          ASSESSMENT AND PLAN:  79 y.o. male status post diverting loop colostomy, pelvic mass biopsy  Tolerating a reg diet and ostomy functioning. Pt and wife receptive to teaching from [facility]. [facility] for discharge from surgery perspective - f/u appt placed in d/c instructions    Adalid Parul     Patient seen and agree with above.     Wisam Valdovinos MD

## 2018-07-24 NOTE — PROGRESS NOTES
Training, Positioning, Pain Management  G-Code  OT G-codes  Functional Assessment Tool Used: AM-PAC  Score: raw score = 23; G-code = CI  Functional Limitation: Self care  Self Care Current Status (): At least 1 percent but less than 20 percent impaired, limited or restricted  Self Care Goal Status (): At least 1 percent but less than 20 percent impaired, limited or restricted  Self Care Discharge Status ():  At least 1 percent but less than 20 percent impaired, limited or restricted                 AM-PAC Score        AM-PAC Inpatient Daily Activity Raw Score: 23  AM-PAC Inpatient ADL T-Scale Score : 51.12  ADL Inpatient CMS 0-100% Score: 15.86  ADL Inpatient CMS G-Code Modifier : CI    Goals  Short term goals  Time Frame for Short term goals: 1-2 additional visits  Short term goal 1: Pt will complete functional transfers with Mod I/Ind  Short term goal 2: Pt will complete LE dressing with Supervision - GOAL MET 7/20  Short term goal 3: Pt will perform 5 minutes dynamic standing activity with Supervision by 7/23; STG partially met  Patient Goals   Patient goals : \"to go home\"       Therapy Time   Individual Concurrent Group Co-treatment   Time In 0815         Time Out 0828         Minutes Luli Broderick

## 2018-07-24 NOTE — PLAN OF CARE
Problem: Nutrition  Goal: Optimal nutrition therapy  Outcome: Ongoing  Nutrition Problem: Inadequate oral intake  Intervention: Food and/or Nutrient Delivery: Continue current diet, Continue current ONS  Nutritional Goals: Pt will tolerate diet and have intakes 50% or greater this admission

## 2018-07-24 NOTE — PROGRESS NOTES
Pt a/o. Tolerating general diet, currently eating yogurt. No nausea and no emesis. Midline incision clean, dry, intact. Staples intact; no drainage. No pain medication needed at this time. Bed locked in lowest position; side rails 2/4; call light within reach; will continue to monitor.

## 2018-07-24 NOTE — PROGRESS NOTES
Nutrition Assessment    Type and Reason for Visit: Reassess    Nutrition Recommendations:   1. General diet  2. Ensure clear with meals  3.  RD provided Ensure samples and coupons  4. Will monitor nutritional adequacy, nutrition-related labs, weights, BMs, and clinical progress     Malnutrition Assessment:  · Malnutrition Status: Meets the criteria for moderate malnutrition  · Context: Chronic illness  · Findings of the 6 clinical characteristics of malnutrition (Minimum of 2 out of 6 clinical characteristics is required to make the diagnosis of moderate or severe Protein Calorie Malnutrition based on AND/ASPEN Guidelines):  1. Energy Intake-Less than or equal to 75%, greater than or equal to 3 months    2. Weight Loss-2% loss or greater, in 3 months  3. Fat Loss-No significant subcutaneous fat loss,    4. Muscle Loss-Mild muscle mass loss, Temples (temporalis muscle), Clavicles (pectoralis and deltoids)  5. Fluid Accumulation-No significant fluid accumulation,    6.  Strength-Not measured    Nutrition Diagnosis:   · Problem: Inadequate oral intake  · Etiology: related to Insufficient energy/nutrient consumption     Signs and symptoms:  as evidenced by Diet history of poor intake, Weight loss    Nutrition Assessment:  · Subjective Assessment: Follow up: Tolerating general diet without any GI upset. RD obatined Ensure clear samples and coupons for patient since possible discharge this afternoon.     · Nutrition-Focused Physical Findings: 75 ml ostomy output x 24 hours  · Wound Type:  (abdominal incision)  · Current Nutrition Therapies:  · Oral Diet Orders: General   · Oral Diet intake: 26-50%, 1-25%  · Oral Nutrition Supplement (ONS) Orders:  (starting back today)  · ONS intake: Unable to assess  · Anthropometric Measures:  · Ht: 5' 11\" (180.3 cm)   · Current Body Wt: 153 lb 3.5 oz (69.5 kg)  · ]% Weight Change: 4.3% loss,  Since May 2018 per EMR (~3 months)  · Ideal Body Wt: 172 lb (78 kg), % Ideal Body

## 2018-07-25 NOTE — PROGRESS NOTES
cancer   - has been treated with chemo, last dose 7/4/18, also has completed radiation. Oncology consulted, appreciate recs   - requested they review his pathology     Hypophos - replaced IV     Moderate malnutrition      DVT Prophylaxis: lovenox ppx   Diet: DIET GENERAL;  Dietary Nutrition Supplements: Clear Liquid Oral Supplement  Code Status: Full Code    PT/OT Eval Status: consulted     Dispo - stable for d/c 7/24.  Stayed overnight due to family comfort level     Devin Kruger MD

## 2018-07-25 NOTE — PROGRESS NOTES
Patient and wife anxious about going home. Wife feels that patient is week and she would like him to go home in the morning to make sure he is tolerating activity, diet, and pain medication. Patient up to bathroom several times, up to chair, showered, performed ostomy care, and ambulated in hallway. Patient tolerated well. Patient resting with eyes closed in bed at this time.

## 2018-07-25 NOTE — PROGRESS NOTES
Patient straight cath for 300 ml of urine. Patient tolerated well. Patient instructed to urinate within 6 hours. If unable to void instructed to call the urologist. Patient and wife verbalized understanding. Patient discharged in stable condition. Patient educated on follow up care, medications, and signs and symptoms of infection. Patient and wife agreeable with plan of care.

## 2018-07-25 NOTE — CARE COORDINATION
MING faxed to [facility] yesterday. Patient stayed overnight due to family comfort level. Spoke with Dr. Ash Caballero ok to to d/c today. No change in plan.  Radames Green RN

## 2018-07-25 NOTE — PROGRESS NOTES
Patient continues with complaints of urinary discomfort and states that the pain is getting worse. Patient rates pain 9/10 when trying to urinate. Patient urinated 75 ml of elio urine and then bladder scanned for >300 ml of residual urine. MD notified.

## 2018-07-25 NOTE — PROGRESS NOTES
Pt alert and oriented, VSS. Shift assessment completed and documented. Pt c/o 3/10 abd pain, PRN adelso given, see eMAR. Pt requesting something for sleep, will page NP. Pt denies any further needs at this time. Bed locked and in lowest position, call light within reach. Will continue to monitor.

## 2018-07-25 NOTE — PROGRESS NOTES
Patient up to chair and tolerating well. Patient assisted up to bathroom and sat up for ADLS. Patient tires easily. Alert and oriented x4. Assessment completed as documented call light within reach. Pt with c/o pain administered PRN pain medication per order.  Vital signs stable

## 2018-07-25 NOTE — PROGRESS NOTES
Ostomy Referral Follow-up Progress Note      NAME:  Candida Monterroso  MEDICAL RECORD NUMBER:  [id number]  AGE: 79 y.o. GENDER:  male  :  1950  TODAY'S DATE:  2018    Subjective:  Per wife \"we were not quite ready to go home yesterday and now today he is having trouble urinating. We have and appointment to see urologist 18\"    Candida Monterroso is a 79 y.o. male referred by:   [x] Physician  [] Nursing  [] Other:     HX:  Sigmoid Colon Obstruction, Metastatic Pancreatic Cancer.  Laparoscopic Converted to Open Diverting Loop Colostomy, Mobilization of Splenic Flexure, Biopsy of Pelvic Mass by Dr Duncan Flores on 18.     Stoma size: 2 inch = 51 mm  Flange size: 2 3/4 inch 2 piece flat flange # [id number] with lock and roll bag # [id number].        Objective  Up to bath room to void    /72   Pulse 99   Temp 98.8 °F (37.1 °C) (Oral)   Resp 16   Ht 5' 11\" (1.803 m)   Wt 153 lb 3.5 oz (69.5 kg)   SpO2 96%   BMI 21.37 kg/m²     Enrique Risk Score Enrique Scale Score: 21    Assessment  Stoma less swollen today. Erika stomal skin intact. Surgeon     Colostomy      Colostomy (Active)   Stomal Appliance 2 piece 2018  1:30 PM   Flange Size (inches) 3.75 Inches 2018  1:30 PM   Stoma  Assessment Moist;Red;Swelling 2018  1:30 PM   Mucocutaneous Junction Intact 2018  1:30 PM   Peristomal Assessment Clean; Intact 2018  1:30 PM   Treatment Bag change;Site care;Stoma paste; Heat applied 2018  1:30 PM   Stool Appearance Loose 2018  1:30 PM   Stool Color Black 2018  1:30 PM   Stool Amount Medium 2018  1:30 PM   Output (mL) 100 ml 2018  1:30 PM   Number of days: 7         Intake/Output Summary (Last 24 hours) at 18 1447  Last data filed at 18 1330   Gross per 24 hour   Intake              480 ml   Output              350 ml   Net              130 ml       Plan:   Plan for Ostomy Care:  Discharge held yesterday as wife and patient felt he was not ready

## 2018-07-26 NOTE — DISCHARGE SUMMARY
Hospital Medicine Discharge Summary    Patient ID: Jacques Morfin      Patient's PCP: Maggi Adler MD    Admit Date: 7/17/2018     Discharge Date: 7/25/2018      Admitting Physician: Tristan Cortez MD     Discharge Physician: Boby Laws MD     Discharge Diagnoses: Active Hospital Problems    Diagnosis Date Noted    Moderate malnutrition ([facility] [E44.0] 07/20/2018    Colonic obstruction [K56.609] 07/17/2018    Malignant neoplasm of pancreas (HCC) [C25.9]     Liver metastasis ([facility] [C78.7] 01/12/2017    Malignant neoplasm of tail of pancreas ([facility] [C25.2] 12/29/2016       The patient was seen and examined on day of discharge and this discharge summary is in conjunction with any daily progress note from day of discharge. Hospital Course:       79 Y M with a h/o metastatic pancreatic cancer had not had a bowel movement for 6 days, with progressive abdominal discomfort and nausea.  Found to have distal colonic obstruction 2/2 mass, now s/p resection      Distal colonic obstruction, due to 3.3 x 6.3 cm mass - now s/p open diverting loop colostomy, pelvic mass biopsy. at this point likely 2/2 metastatic pancreatic ca rather than new primary ca, hem/onc following. Pain controlled, diet advanced. Follow up with hem/onc       Dysuria - suspect 2/2 pitts trauma and possibly related to urinary retention. Start on flomax. Patient to follow up with urology within a couple of days. No evidence of UTI, nephrolithiasis.      Acute on chronic anemia - suspect 2/2 post surgical blood loss with baseline around 8 or so. May have chemotherapy related anemia as well. Improved s/p transfusion 7/19       LUE edema 2/2 superficial thrombosis - no DVT, continue with elevation, compression, heat packs.  Continue DVT ppx       Hematemesis   - baseline Hb fluctuates, but might be about 8.  Monitor.    - he also had this in may and was found to have hemorrhagic gastritis.  CT here showed extensive perigastric Diagnostic Studies    Radiology:   VL Extremity Venous Left   Final Result      XR ABDOMEN (KUB) (SINGLE AP VIEW)   Final Result   Stable colonic obstruction, with transition at the level of the sigmoid. XR Acute Abd Series Chest 1 VW   Final Result   1. No acute pulmonary process. 2. Colonic distention with the cecum measuring 10 cm. Findings which may   represent colitis or possible colonic ileus. 3. No evidence of perforation. CT ABDOMEN PELVIS W IV CONTRAST Additional Contrast? None   Final Result   1. Stable pancreatic tail 1.9 cm mass. 2. Significant progressive hepatic metastatic disease. 3. Associated with the proximal and distal sigmoid colon there is a   hyperdense 3.3 x 6.3 cm mass resulting in distal colonic obstruction with   moderate distention. This may represent primary colonic malignancy versus   peritoneal metastatic disease. 4. Extensive perigastric varices which likely relate to the patient's history   of hematemesis. 5. New ascites. 6. Stable splenomegaly with innumerable indeterminate hypodense lesions   measuring less than 1 cm. 7. Prostatomegaly. Consults:     IP CONSULT TO HEM/ONC  IP CONSULT TO HOSPITALIST  IP CONSULT TO GENERAL SURGERY  IP CONSULT TO GI  IP CONSULT TO GENERAL SURGERY  IP CONSULT TO GI  IP CONSULT TO HOME CARE NEEDS    Disposition:  Home with [place] [id number]      Condition at Discharge: Stable    Discharge Instructions/Follow-up:  Follow up with hem/onc, urology     Code Status:  Ful    Activity: activity as tolerated    Diet: regular diet      Discharge Medications:     Discharge Medication List as of 7/25/2018  2:34 PM           Details   tamsulosin (FLOMAX) 0.4 MG capsule Take 1 capsule by mouth daily, Disp-30 capsule, R-0Normal      oxyCODONE (ROXICODONE) 5 MG immediate release tablet Take 1 tablet by mouth every 4 hours as needed for Pain for up to 7 days. ., Disp-15 tablet, R-0Print              Details   pantoprazole (PROTONIX) 40

## 2018-08-03 NOTE — PROGRESS NOTES
HPI: Nursing notes reviewed. Patient reports low energy. Appetite is low. No nausea. No issues with colostomy. Minimal pain. ROS:  10 point review of systems performed with pertinent positives in HPI    Phys:       Abd - soft, minimally tender, nondistended, ostomy functional   Incision - no drainage    Assesment: 78 yo s/p diverting loop colostomy    Plan: 1. Promoted po intake and supplements   2. Continue with ostomy care   3. Staples removed   4.   F/u in two weeks

## 2018-08-13 PROBLEM — A41.9 SEPTIC SHOCK (HCC): Status: ACTIVE | Noted: 2018-01-01

## 2018-08-13 PROBLEM — R65.21 SEPTIC SHOCK (HCC): Status: ACTIVE | Noted: 2018-01-01

## 2018-08-13 NOTE — CONSULTS
change to meropenem if renal function worsens  -Titrate FIO2 as tolerated  -ICU level of care    This patient is critically ill due to life threatening respiratory failure and septic shock. Total critical care time involved in his care was 34 mins excluding separately listed procedures.      Edel Vizcaino MD

## 2018-08-13 NOTE — PROGRESS NOTES
Critical care consult placed @ 1198  RE: septic shock per Dr. Trish Davison returned call @ 8791, spoke to Dr. Trish Urias.

## 2018-08-13 NOTE — CONSULTS
Pharmacy to Dose Vancomycin and Zosyn    Dx: septic shock, limited progress notes available at time of discharge  Goal trough = 15-20 mcg/mL for now  Pt wt = 68 kg  Estimated Creatinine Clearance: 27 mL/min (A) (based on SCr of 2.6 mg/dL (H)). Vancomycin 1000mg IVPB x 1 in ED at 1049 today. Random Vancomycin level tomorrow AM.  Zosyn 4.5g IVPB x 1 as ordered in ER, administered in ICU. Start Zosyn 3.375g IVPB q8h (infuse over 4 hours). Increased risk for MUKUND when Zosyn and Vancomycin used together. Meghan Davis PharmD, BCPS   8/13/2018 12:51 PM    Day 2  Estimated Creatinine Clearance: 27 mL/min (A) (based on SCr of 2.7 mg/dL (H)). Zosyn changed to Merrem 1g q12h  Vancomycin random level = 11.3 mcg/mL  Vancomycin 1000mg IVPB x 1 today  Vancomycin random level tomorrow AM  Meghan Moyer.  Ryan PharmSUMAN, BCPS   8/14/2018 8:29 AM

## 2018-08-13 NOTE — ED NOTES
Spoke with Gladis in lab. Panic calcium was called to me. I do not see a panic calcium level in chart. Called lab back. Per Shelbie Blackburn it was a mistake per lab. There is no panic value on this pt.      Ursula IzquierdoPaladin Healthcare  08/13/18 8404

## 2018-08-13 NOTE — ED PROVIDER NOTES
DIAGNOSTIC LAPAROSCOPY, CONVERTED TO OPEN BOWEL RESECTION AND CREATION OF COLOSTOMY performed by Ryley Riggins MD at [facility] TUNNELED VENOUS PORT PLACEMENT  2017    Smart Port (placed at [facility])   [address]  01/12/2017    gastric mass biopsy     UPPER GASTROINTESTINAL ENDOSCOPY  02/17/2017    UPPER GASTROINTESTINAL ENDOSCOPY  12/08/2017    duodenitis, gastritis    UPPER GASTROINTESTINAL ENDOSCOPY  05/13/2018         CURRENT MEDICATIONS       Current Discharge Medication List      CONTINUE these medications which have NOT CHANGED    Details   megestrol (MEGACE) 20 MG tablet Take 20 mg by mouth daily      lidocaine viscous 2 % SOLN 24 mL, diphenhydrAMINE 12.5 MG/5ML LIQD 48 mL, nystatin 523874 UNIT/ML SUSP 48 mL Swish and swallow 5 mLs 4 times daily as needed for Irritation      oxyCODONE (ROXICODONE) 5 MG immediate release tablet Take 5 mg by mouth every 4 hours as needed for Pain. .      finasteride (PROSCAR) 5 MG tablet Take 5 mg by mouth daily      tamsulosin (FLOMAX) 0.4 MG capsule Take 1 capsule by mouth daily  Qty: 30 capsule, Refills: 0      pantoprazole (PROTONIX) 40 MG tablet Take 1 tablet by mouth 2 times daily  Qty: 30 tablet, Refills: 0      acidophilus (LACTINEX/FLORANEX) Take 1 tablet by mouth 2 times daily      calcium carbonate 600 MG TABS tablet Take 1 tablet by mouth 2 times daily  Qty: 30 tablet, Refills: 3             ALLERGIES     Sulfa antibiotics    FAMILY HISTORY       Family History   Problem Relation Age of Onset    Heart Disease Father           SOCIAL HISTORY       Social History     Social History    Marital status:      Spouse name: N/A    Number of children: N/A    Years of education: N/A     Social History Main Topics    Smoking status: Never Smoker    Smokeless tobacco: Never Used    Alcohol use No    Drug use: No    Sexual activity: Not Asked     Other Topics Concern    None     Social History Narrative    None 09:19  ANTIBIOTICS AT [place].:                      RECEIVED :  08/13/18 09:25  Performed at:  [address],  [place], [address]   Phone [phone]   LACTATE, SEPSIS - Abnormal; Notable for the following:     Lactic Acid, Sepsis 7.3 (*)     All other components within normal limits    Narrative:     Mark Clark tel. [phone],  Chemistry results called to and read back by collin kim, 08/13/2018  13:24, by Jesse Dobbins  Performed at:  [address],  [place], [address]   Phone [phone]   LACTATE, SEPSIS - Abnormal; Notable for the following:     Lactic Acid, Sepsis 6.5 (*)     All other components within normal limits    Narrative:     Mark Clark tel. [phone],  Chemistry results called to and read back by Tracey Joe RN, 08/13/2018  14:33, by Taran Ramirez  Performed at:  [address],  [place], [address]   Phone [phone]   CULTURE BLOOD #1   CULTURE BLOOD #1   AMMONIA    Narrative:     Performed at:  [address],  [place], [address] [employer]   Phone [phone]   APTT    Narrative:     Performed at:  [address],  [place], [address]   Phone [phone]   TROPONIN    Narrative:     Dmitry Ramos tel. [phone],  Chemistry results called to and read back by Aniya Leroy, 08/13/2018 09:06,  by Jen Donald  Performed at:  [address],  [place], [address]   Phone [phone]   MAGNESIUM    Narrative:     Richard Dutton [phone],  Chemistry results called to and read back by Aniya Leroy, 08/13/2018 09:06,  by Jen Donald  Performed at:  [address],  [place], [place] [employer]   Phone [phone]   TYPE AND SCREEN    Narrative:     Performed at:  [facility] adenopathy in the left-sided pelvis, difficult to separate from   sigmoid colon but appears to be the cause of the patient's new left-sided   hydroureteronephrosis. XR ABDOMEN (KUB) (SINGLE AP VIEW)   Final Result   Findings may represent small bowel obstruction or ileus. XR CHEST PORTABLE   Final Result   Left-sided effusion with associated airspace disease which could represent   atelectasis or pneumonia. CT Head WO Contrast   Final Result   1. No acute intracranial abnormality. PROCEDURES   Unless otherwise noted below, none     Procedures    CRITICAL CARE TIME   Time: 45 minutes   Includes repeat examinations, speaking with consultants, lab interpretation, charting  Excludes separate billable procedures. Patient at risk for serious decompensation if not treated for this life-threatening condition. On Reassessment After Fluid Resuscitation:    Updated vital signs:  94/54, 110, 27, 96% on 4 L NC (at 1026)    Cardiopulmonary examination:  No respiratory distress with bilateral breath sounds present but diminished throughout and still with tachycardia  Capillary refill: brisk in all digits  Peripheral pulses: 2+ radial and DP pulses bilaterally  Skin examination: warm and dry    Fluid status improved on second examination. CONSULTS: Spoke with Dr. Li Clinton at 6203 for ICU. Spoke with Dr. Lulla Schwab for admission to ICU. He will follow up on CT scans.     IP CONSULT TO CRITICAL CARE  IP CONSULT TO HOSPITALIST  IP CONSULT TO CRITICAL CARE  PHARMACY TO DOSE VANCOMYCIN    EMERGENCY DEPARTMENT COURSE and DIFFERENTIAL DIAGNOSIS/MDM:   Vitals:    Vitals:    08/13/18 1358 08/13/18 1400 08/13/18 1402 08/13/18 1507   BP:   (!) 88/48    Pulse: 96 97 93 104   Resp: 25  27 (!) 33   Temp:   98 °F (36.7 °C)    TempSrc:   Core    SpO2: 95%  95% 94%   Weight:       Height:           Patient was given the following medications:  Medications   norepinephrine (LEVOPHED) 16 mg in dextrose 5 % 250 mL nurse to hold off on this and recheck hemoglobin first before we will even consider giving any blood but to keep this blood on hold in case he needs it. He did have great improvement with altered mental status once his blood pressure improved. We spoke with pharmacy about vasopressors going through his port and they stated this would be okay and therefore I did not place a central line. Initial lactate was elevated and therefore did repeat exam showing improvement with fluid status prior to admission. However, he will need further evaluation in the ICU in regards to septic shock most likely from pneumonia but also possibly from UTI. He is critical at time of transfer to ICU but improved compared to arrival to ED. The patient tolerated their visit well. The patient and / or the family were informed of the results of any tests, a time was given to answer questions. FINAL IMPRESSION      1. Septic shock ([address]    2. Acute cystitis without hematuria    3.  Pneumonia of left lower lobe due to infectious organism [facility])          [address] Admitted 08/13/2018 10:32:52 AM      PATIENT REFERRED TO:  Nora Jimenez MD  [address] [place] [address] Dr  [phone]            DISCHARGE MEDICATIONS:  Current Discharge Medication List          DISCONTINUED MEDICATIONS:  Current Discharge Medication List                 (Please note that portions of this note were completed with a voice recognition program.  Efforts were made to edit the dictations but occasionally words are mis-transcribed.)    Ivonne Penn MD (electronically signed)            Ivonne Penn MD  08/13/18 6948

## 2018-08-14 NOTE — PROGRESS NOTES
08/13/18   0816  08/13/18 2028  08/14/18   0045  08/14/18   0521   WBC  7.8   --    --   71.2*   HGB  9.3*  7.4*  6.7*  7.6*   HCT  28.9*  23.6*  21.2*  23.3*   MCV  88.8   --    --   87.1   PLT  141   --    --   82*     BMP:   Recent Labs      08/13/18   0816  08/14/18   0521   NA  137  138   K  4.0  4.3   CL  100  105   CO2  20*  22   BUN  61*  56*   CREATININE  2.6*  2.7*     LIVER PROFILE:   Recent Labs      08/13/18 0816   AST  26   ALT  33   BILITOT  1.4*   ALKPHOS  435*     PT/INR:   Recent Labs      08/13/18 0816   PROTIME  17.1*   INR  1.50*     APTT:   Recent Labs      08/13/18 0816   APTT  29.3     UA:  Recent Labs      08/13/18   0839   COLORU  Yellow   PHUR  6.0   WBCUA  10-20*   RBCUA  3-5*   BACTERIA  4+*   CLARITYU  CLOUDY*   SPECGRAV  1.015   LEUKOCYTESUR  MODERATE*   UROBILINOGEN  0.2   BILIRUBINUR  Negative   BLOODU  TRACE-INTACT*   GLUCOSEU  Negative   AMORPHOUS  1+*     No results for input(s): PHART, SLB4CXW, PO2ART in the last 72 hours. CXR personally reviewed, persistent bilateral pleural effusions          Assessment:     1. Acute resp failure, hypoxic              -left pleural effusion of unclear etiology  2. Septic shock              -suspected urinary source with GNR bacteremia  3. Pancreatic cancer with mets, s/p colostomy now with bloody output  4. MUKUND  5. Acute on chronic anemia              -concern for lower GI bleed    Plan:      -Awaiting cytology from fluid. Continue serial imaging. Doubt the need for a chest tube at this time  -Continue meropenem, will discontinue vancomycin. Follow up on blood cultures  -GI involved, plan for endoscopy today. Monitor H/H. Will continue protonix gtt  -Titrate vasopressor support as needed, monitor hemodynamics  -Wean supplemental oxygen as tolerated    This patient is critically ill due to life threatening respiratory failure and septic shock.  Total critical care time involved in his care was 31 mins excluding separately listed procedures.      Fab Fine MD

## 2018-08-14 NOTE — PLAN OF CARE
IV),  mg   Infed (Iron Dextran IV),  mg  Fluorouracil (Bolus + CIV) D1-2 + Leucovorin D1 + Irinotecan D1 + Oxaliplatin D1 (FOLFIRINOX) Q14D Cycle Length: 14 Number Cycles: 16 Start: Talat Smith on 10/12/2017 Assoc Dx: Malignant tumor of pancreas (disorder) LOT: 2nd Line Metastatic Stage: IV 10/12/2017 C8 D1  Oxaliplatin IV, 90 mg (47.813 mg/m2), Dose modified   Irinotecan IV, 250 mg (135 mg/m2), Dose modified   Leucovorin IV, 558 mg (300 mg/m2), Dose modified   Fluorouracil IV, 560 mg (300 mg/m2), Dose modified   Neulasta (Pegfilgrastim Subcutaneous),  mg   Neulasta (Pegfilgrastim Subcutaneous), 6 mg   Fluorouracil IV, 3350 mg (1800 mg/m2), Dose modified   Pegfilgrastim Subcutaneous, 6 mg  Pegfilgrastim D1 Q21D Cycle Length: 21 Number Cycles: 1 Start: C1D1 on 2018 Assoc Dx: Malignant tumor of pancreas (disorder) LOT: Other 2018 C1 D2  Pegfilgrastim Subcutaneous,  mg  OHC Hydration Cycle Length: 1 Number Cycles: 7 Start: Talat Smith on 2018 Assoc Dx: Malignant tumor of pancreas (disorder) LOT: Toxicity Management 2018 C2 D1  Sodium Chloride IV 0.9 %, .9 % 1000 mL, Dose modified  Iron Sucrose D1 Q14D Cycle Length: 14 Number Cycles: 1 Start: Talat Smith on 2018 Assoc Dx: Iron deficiency anemia (disorder) LOT: Toxicity Management 2018 C1 D1  Iron Sucrose IV,  mg  OHC Hydration Cycle Length: 1 Number Cycles: 7 Start: C1D1 on 2018 Assoc Dx: Malignant tumor of pancreas (disorder) LOT: Toxicity Management Treatment Intent: Kkkzaeldha21/06/2018 C1 D1  Sodium Chloride IV 0.9 %, 0.9 % solution 1000 mL  Interval History   Patient is here today for follow-up for pancreatic cancer, and post-hospitalization. Patient was recently hospitalized for large bowel obstruction which resulted in colectomy with a colostomy creation. Since being discharged from the hospital on , patient reports that he has been doing ok.  A biopsy was taken of the colonic mass while patient was hospitalized, and he is currently awaiting genetic testing, and biopsy results. Patient says he has not had an appetite since returning home and that he is only drinking about 1-1.5 protein shakes a day. He has no nausea or pain with PO intake, he simply does not have a desire to eat. He has taken Megace in the past for appetite, but thinks his home supply is . Patinent has lost about 10 pounds since his last visit. Patient has also noticed that since his hospitalization he has been urinating more frequently. He estimates that his is going about once per hour, both in the day and at nighttime. Patient has been seen by urology, who found a diffusely enlarged prostate, and placed him on Flomax as well as Finasteride, which he says has not significantly altered his urinary frequency. He reports no pain with urination, and says that his volume has been good. Patient does not report any fevers, nausea or vomiting at this time.        Review of Systems  Per interval history; otherwise 10 point ROS is negative   Vital Signs  Blood pressure: 120/70, R arm, Regular, Pulse: 89, Temperature: 99.1 F, Respirations: 16, O2 sat: , Pain Scale: 4, Height: 70 in, Weight: 141 lb, BSA: 1.78, BMI: 20.23 kg/m2   Physical Exam   CONSTITUTIONAL: awake, alert, cooperative, no apparent distress   EYES: Pupils equal, round, and reactive to light, sclera clear, conjunctiva normal   ENT: Normocephalic, without obvious abnormality, atraumatic   NECK: supple, symmetrical, no jugular venous distension and no carotid bruits   HEMATOLOGIC/LYMPHATIC: no cervical, supraclavicular or axillary lymphadenopathy   LUNGS: no increased work of breathing and clear to auscultation   CARDIOVASCULAR: regular rate and rhythm, normal S1 and S2, no murmur noted   ABDOMEN: normal bowel sounds x 4, soft, non-distended, non-tender, no masses palpated, no hepatosplenomegaly   MUSCULOSKELETAL: full range of motion noted, tone is normal   NEUROLOGIC: awake, ALT/SGPT U/L    16 18 21 17      GFR non-African American, estimated mL/min/1.73m2    >60.0 >60.0 >60.0 >60.0      GFR African American, estimated mL/min/1.73m2    >60.0 >60.0 >60.0 >60.0      Cholesterol mg/dL       145            Triglycerides mg/dL       103            LDL cholesterol, calculated mg/dL       81            HDL cholesterol mg/dL       43              Lab Results 08/06/2018 07/05/2018 06/21/2018 06/07/2018 05/24/2018 05/13/2018   [employer]                     CA 19-9 U/mL    74777 (H)    5150 (H)           Lab Results 08/06/2018 07/05/2018 06/21/2018 06/07/2018 05/24/2018 05/13/2018   AnemiaLabs                     Iron / FE ug/dL       34 (L)            TIBC ug/dL       259            Iron, % saturation %       13 (L)            Ferritin ng/mL       154.4            Imaging    CT Chest/Abd/Pelvis 04/18/2018  IMPRESSION:  One of the two lesions in the dome of the liver appears to have resolved, the  other is unchanged. Unchanged numerous small low-density lesions throughout the spleen with  splenic enlargement. Unchanged low-density mass-like focus along the greater curvature of the  stomach adjacent to the spleen. Unchanged small mass of the tail of the pancreas. Unchanged prostatomegaly. No evidence of metastatic disease in the chest.    OCM - Patient Care Management   Pain Scale Value 4   Pain Management Plan:        ECOG Status 0 Normal activity. Fully active, able to carry on all pre-disease performance without restriction. (Date: 07/05/2018)   Depression Status Was screened;  Outcome positive: No; Screening Date: 02/08/2018; Screening Tool: Patient Health Questionnaire (PHQ9)   Psycho-social PHQ-9 Follow-up Plan (if applicable):   Assessment & Plan  Advanced pancreatic carcinoma  -CBC reviewed with the patient  -Patient is s/p completion of 16 cycles of FOLFIRINOX, but continued to have disease progression.  -Patient recently had mass biopsy which was sent for genetic analysis and is

## 2018-08-14 NOTE — CONSULTS
Hematology/Oncology Consultation         Patient:   Emmy Morris       Reason for Consult:  Stage IV pancreatic cancer with sepsis   Requesting Physician: No ref. provider found    Chief Complaint:     Chief Complaint   Patient presents with    Shortness of Breath     pt is from home and last night noticed blood in the colostomy bag, pt is short of breath and warm to the touch. Pt is in and out of being alert.  Fever    Rectal Bleeding                  History Obtained from:     patient, spouse, electronic medical record    History of Present Illness:     Emmy Morris is a 79 y.o. male  with significant past medical history of stage IV pancreatic cancer who presents with SOB, fatigue, failure to thrive. He is currently in the ICU on Zosyn, Vanco for presumed HCAP. He was on pressors earlier in the AM but now off with SBP in the  range. Wife at bedside. He is having some GI bleeding, s/p PRBC last night on Protonix drip. Upper EGD planned for today as well.        Past Medical History:         Diagnosis Date    Abdominal pain     Anemia     Blood in stool     Cancer ([facility] [address] 12/2016    PANCREATIC    History of blood transfusion 01/13/2017    LAST 2 UNITS       Past Surgical History:         Procedure Laterality Date    COLONOSCOPY  12/2016    polyp    COLOSTOMY  07/18/2018     Laparoscopic Converted to Open Diverting Loop Colostomy, Mobilization of Splenic Flexure, Biopsy of Pelvic Mass    LIVER BIOPSY  01/03/2017    AK LAP,SURG,COLECT,TOT,W/PROCTECT,W/ILEOST N/A 7/18/2018    DIAGNOSTIC LAPAROSCOPY, CONVERTED TO OPEN BOWEL RESECTION AND CREATION OF COLOSTOMY performed by Branden Guillory MD at [facility] TUNNELED VENOUS PORT PLACEMENT  2017    Smart Port (placed at [facility])   [address] [place] [address] Drive  01/12/2017    gastric mass biopsy     UPPER GASTROINTESTINAL ENDOSCOPY  02/17/2017    UPPER GASTROINTESTINAL ENDOSCOPY  12/08/2017    duodenitis, gastritis    UPPER GASTROINTESTINAL ENDOSCOPY  05/13/2018       Current Medications:     Current Facility-Administered Medications   Medication Dose Route Frequency Provider Last Rate Last Dose    dextrose 50 % solution 25 g  25 g Intravenous PRN Manuel Oviedo MD   25 g at 08/14/18 0648    meropenem (MERREM) 1 g in sodium chloride 0.9 % 100 mL IVPB (mini-bag)  1 g Intravenous Q12H Cristo Obrien MD        lactobacillus (CULTURELLE) capsule 1 capsule  1 capsule Oral BID Irsael Swan MD   Stopped at 08/13/18 1430    calcium elemental (OSCAL) tablet 500 mg  500 mg Oral BID Israel Swan MD   Stopped at 08/13/18 1430    finasteride (PROSCAR) tablet 5 mg  5 mg Oral Daily Israel Swan MD   Stopped at 08/13/18 1430    Magic Mouthwash (Miracle Mouthwash) 5 mL  5 mL Swish & Swallow 4x Daily PRN Israel Swan MD        megestrol (MEGACE) tablet 20 mg  20 mg Oral Daily Israel Swan MD   Stopped at 08/13/18 1431    oxyCODONE (ROXICODONE) immediate release tablet 5 mg  5 mg Oral Q4H PRN Israel Swan MD        sodium chloride flush 0.9 % injection 10 mL  10 mL Intravenous 2 times per day Israel Swan MD        sodium chloride flush 0.9 % injection 10 mL  10 mL Intravenous PRN Israel Swan MD        0.9 % sodium chloride infusion   Intravenous Continuous Israel Swan  mL/hr at 08/14/18 0029      mupirocin (BACTROBAN) 2 % ointment   Nasal BID Gianna Esqueda MD        magic (miracle) mouthwash with nystatin  5 mL Swish & Spit 4x Daily PRN Israel Swan MD        pantoprazole (PROTONIX) 80 mg in sodium chloride 0.9 % 100 mL infusion  8 mg/hr Intravenous Continuous Israel Swan MD 10 mL/hr at 08/14/18 0935 8 mg/hr at 08/14/18 0935       Allergies:      Allergies   Allergen Reactions    Sulfa Antibiotics Itching     CHILDHOOD   unknown childhood reaction       Social History:     Social History     Social History    Marital status:      Spouse name: N/A    --        TUMOR MARKERS:  Recent Labs      07/17/18   1425     1995*         MAGNESIUM:    Lab Results   Component Value Date    MG 2.00 08/13/2018       PT/INR:    No results found for: PTINR    Lab Results   Component Value Date    INR 1.50 08/13/2018       PTT:    Lab Results   Component Value Date    APTT 29.3 08/13/2018       U/A:    Lab Results   Component Value Date    COLORU Yellow 08/13/2018    PHUR 6.0 08/13/2018    WBCUA 10-20 08/13/2018    RBCUA 3-5 08/13/2018    BACTERIA 4+ 08/13/2018    CLARITYU CLOUDY 08/13/2018    SPECGRAV 1.015 08/13/2018    LEUKOCYTESUR MODERATE 08/13/2018    UROBILINOGEN 0.2 08/13/2018    BILIRUBINUR Negative 08/13/2018    BILIRUBINUR Negative 02/02/2017    BLOODU TRACE-INTACT 08/13/2018    GLUCOSEU Negative 08/13/2018    AMORPHOUS 1+ 08/13/2018       Imaging:     Large left-sided pleural effusion with adjacent compressive atelectasis.       Progression of metastatic disease within the liver.       No evidence of bowel obstruction although there is possible gastritis and   possible colitis.  Evaluation limited due to lack of contrast and collapse of   bowel loops.       New moderate amount of ascites.       Mass versus adenopathy in the left-sided pelvis, difficult to separate from   sigmoid colon but appears to be the cause of the patient's new left-sided   hydroureteronephrosis. Impression:     Stage IV Pancreatic CA   HCAP  Leukocytosis   Sepsis     Plan:     1) Metastatic Pancreatic cancer with sepsis like picture  - We discussed how his most recent [employer] testing did not reveal any meaningful treatment options left   - We discussed cancelling the UGD and focusing on comfort. - He will discuss hospice with his wife privately, he would like to go home   - Consult palliative care     2) Leukocytosis  - Leukemoid reaction from progressive cancer vs. Sepsis     Overall, his clinical picture appears terminal. He would benefit from hospice care. Thank you very much for allowing me to participate in the care of this patient.     Patel Benavides CNP   Medical Oncology/Hematology    [facility] - [address],  [address]  Phone: [phone]  Fax: [phone]

## 2018-08-14 NOTE — PROGRESS NOTES
Hospitalist Progress Note      PCP: Stella Navarrete MD    Date of Admission: 8/13/2018    Chief Complaint: weakness    Hospital Course: admitted with GI bleeding and sepsis. In the ICU. Quick progression of the acute disease noted. Patient requests hospice    Subjective: weak. No pain       Medications:  Reviewed    Infusion Medications    sodium chloride 100 mL/hr at 08/14/18 0029     Scheduled Medications    meropenem  1 g Intravenous Q12H    pantoprazole  40 mg Intravenous BID    lactobacillus  1 capsule Oral BID    calcium elemental  500 mg Oral BID    finasteride  5 mg Oral Daily    megestrol  20 mg Oral Daily    sodium chloride flush  10 mL Intravenous 2 times per day    mupirocin   Nasal BID     PRN Meds: dextrose, oxyCODONE, sodium chloride flush, magic (miracle) mouthwash with nystatin      Intake/Output Summary (Last 24 hours) at 08/14/18 1548  Last data filed at 08/14/18 0730   Gross per 24 hour   Intake             1361 ml   Output             1145 ml   Net              216 ml       Physical Exam Performed:    BP 98/61   Pulse 98   Temp 97.9 °F (36.6 °C) (Core)   Resp 30   Ht 5' 11\" (1.803 m)   Wt 156 lb 15.5 oz (71.2 kg)   SpO2 96%   BMI 21.89 kg/m²     General appearance: No apparent distress, appears stated age. Pleasant, extremely weak and cooperative. HEENT: Pupils equal, round, and reactive to light. Conjunctivae/corneas clear. Neck: Supple, with full range of motion. No jugular venous distention. Trachea midline. Respiratory:  Weak respiratory effort. Clear to auscultation, bilaterally without Rales/Wheezes/Rhonchi. Cardiovascular: Tachycardic with normal S1/S2 without murmurs, rubs or gallops. Abdomen: Soft, colostomy with brown liquid stool  Musculoskeletal: No clubbing, cyanosis or edema bilaterally. Atrophic muscles  Skin: Icteric and pale skin  Neurologic:  Grossly non-focal.  Psychiatric: Alert and oriented, thought content appropriate, normal insight.  Voice is like a whisper. Extremely weak  Capillary Refill: Brisk,< 3 seconds   Peripheral Pulses: +2 palpable, equal bilaterally       Labs:   Recent Labs      08/13/18   0816  08/13/18 2028  08/14/18   0045  08/14/18   0521   WBC  7.8   --    --   71.2*   HGB  9.3*  7.4*  6.7*  7.6*   HCT  28.9*  23.6*  21.2*  23.3*   PLT  141   --    --   82*     Recent Labs      08/13/18   0816  08/14/18   0521   NA  137  138   K  4.0  4.3   CL  100  105   CO2  20*  22   BUN  61*  56*   CREATININE  2.6*  2.7*   CALCIUM  13.9*  11.5*     Recent Labs      08/13/18 0816   AST  26   ALT  33   BILITOT  1.4*   ALKPHOS  435*     Recent Labs      08/13/18 0816   INR  1.50*     Recent Labs      08/13/18 0816   TROPONINI  <0.01       Urinalysis:    Lab Results   Component Value Date    NITRU Negative 08/13/2018    WBCUA 10-20 08/13/2018    BACTERIA 4+ 08/13/2018    RBCUA 3-5 08/13/2018    BLOODU TRACE-INTACT 08/13/2018    SPECGRAV 1.015 08/13/2018    GLUCOSEU Negative 08/13/2018       Radiology:  XR CHEST PORTABLE   Final Result   Left larger than right pleural effusions with associated atelectasis and/or   pneumonia. Right basilar disease appears mildly increased. CT Chest WO Contrast   Final Result   Large left-sided pleural effusion with adjacent compressive atelectasis. Progression of metastatic disease within the liver. No evidence of bowel obstruction although there is possible gastritis and   possible colitis. Evaluation limited due to lack of contrast and collapse of   bowel loops. New moderate amount of ascites. Mass versus adenopathy in the left-sided pelvis, difficult to separate from   sigmoid colon but appears to be the cause of the patient's new left-sided   hydroureteronephrosis. CT ABDOMEN PELVIS WO CONTRAST Additional Contrast? None   Final Result   Large left-sided pleural effusion with adjacent compressive atelectasis. Progression of metastatic disease within the liver.       No evidence of bowel obstruction although there is possible gastritis and   possible colitis. Evaluation limited due to lack of contrast and collapse of   bowel loops. New moderate amount of ascites. Mass versus adenopathy in the left-sided pelvis, difficult to separate from   sigmoid colon but appears to be the cause of the patient's new left-sided   hydroureteronephrosis. XR ABDOMEN (KUB) (SINGLE AP VIEW)   Final Result   Findings may represent small bowel obstruction or ileus. XR CHEST PORTABLE   Final Result   Left-sided effusion with associated airspace disease which could represent   atelectasis or pneumonia. CT Head WO Contrast   Final Result   1. No acute intracranial abnormality. Assessment/Plan:    Active Hospital Problems    Diagnosis Date Noted    Septic shock ([facility] [address] [A41.9, R65.21] 08/13/2018       PLAN:    Severe sepsis  Critical care consulted  Continue pressor support for now  Empiric Zosyn and vanco for HCAP  Follow up on culture results    Acute GI bleed; Has previously known stomach mass from metastatic pancreas cancer. EGD was planned. Patient has declined and decided to go home with hospice     Anemia of acute blood loss;  H/H low but stable. Required transfusion on admission     Metastatic pancreatic cancer:  Appears to be actively advancing despite receiving chemotherapy.  Requested hospice      DVT Prophylaxis: NA  Diet: DIET CLEAR LIQUID;  Code Status: Full Code    PT/OT Eval Status: NA    Dispo - hospice being planned    Porfirio Raman MD

## 2018-08-14 NOTE — PLAN OF CARE
Management                                                                        Marixa Woodson, RN

## 2018-08-14 NOTE — CONSULTS
Ostomy Referral Progress Note      NAME:  Fatoumata Ding  MEDICAL RECORD NUMBER:  [id number]  AGE: 79 y.o. GENDER:  male  :  1950  TODAY'S DATE:  2018    Subjective     Morgan Sherman is a 79 y.o. male referred by:   [] Physician  [x] Nursing  [] Other:     HX:  Sigmoid Colon Obstruction, Metastatic Pancreatic Cancer. Laparoscopic Converted to Open Diverting Loop Colostomy, Mobilization of Splenic Flexure, Biopsy of Pelvic Mass by Dr Humberto Fatima on 18. Existing colostomy.      Stoma size: 1 1/8 inch = 29 cm x 2 inch = 51 mm  Flange size: 2 3/4 inch 2 piece flat flange # [id number] with lock and roll bag # [id number]. PAST MEDICAL HISTORY:        Diagnosis Date    Abdominal pain     Anemia     Blood in stool     Cancer ([facility] [address] 2016    PANCREATIC    History of blood transfusion 2017    LAST 2 UNITS       MEDICATIONS:    No current facility-administered medications on file prior to encounter.       Current Outpatient Prescriptions on File Prior to Encounter   Medication Sig Dispense Refill    finasteride (PROSCAR) 5 MG tablet Take 5 mg by mouth daily      tamsulosin (FLOMAX) 0.4 MG capsule Take 1 capsule by mouth daily 30 capsule 0    pantoprazole (PROTONIX) 40 MG tablet Take 1 tablet by mouth 2 times daily 30 tablet 0    acidophilus (LACTINEX/FLORANEX) Take 1 tablet by mouth 2 times daily      calcium carbonate 600 MG TABS tablet Take 1 tablet by mouth 2 times daily 30 tablet 3       ALLERGIES:    Allergies   Allergen Reactions    Sulfa Antibiotics Itching     CHILDHOOD   unknown childhood reaction       PAST SURGICAL HISTORY:    Past Surgical History:   Procedure Laterality Date    COLONOSCOPY  2016    polyp    COLOSTOMY  2018     Laparoscopic Converted to Open Diverting Loop Colostomy, Mobilization of Splenic Flexure, Biopsy of Pelvic Mass    LIVER BIOPSY  2017    MA LAP,SURG,COLECT,TOT,W/PROCTECT,W/ILEOST N/A 2018 Unsuccessful      [] Verbal Understanding  [] Demonstrated understanding       [] No evidence of learning  [] Refused teaching         [] N/A    Electronically signed by Christian Iyer RN, MSN, [place] on 8/14/2018 at 1:05 PM

## 2018-08-14 NOTE — CONSULTS
Full Note dictated. Chart reviewed. Patient interviewed and examined. Metastatic pancreatic cancer admitted with sepsis and UGI bleed. H/o hemorhagic gastritis. Scheduled for EGD but in interim Hospice consulted and patient refuses endoscopy. Will sign off.  Call if needed

## 2018-08-15 NOTE — PROGRESS NOTES
4 Eyes Skin Assessment     The patient is being assess for   Shift Handoff    I agree that 2 RN's have performed a thorough Head to Toe Skin Assessment on the patient. ALL assessment sites listed below have been assessed. Areas assessed by both nurses:   [x]   Head, Face, and Ears   [x]   Shoulders, Back, and Chest, Abdomen  [x]   Arms, Elbows, and Hands   [x]   Coccyx, Sacrum, and Ischium  [x]   Legs, Feet, and Heels          **SHARE this note so that the co-signing nurse is able to place an eSignature**    Co-signer eSignature: {Esignature:[id number]}    Does the Patient have Skin Breakdown? No          Enrique Prevention initiated:  Yes   Wound Care Orders initiated:  Yes; But for ostomy care.  No wounds      [facility] nurse consulted for Pressure Injury (Stage 3,4, Unstageable, DTI, NWPT, Complex wounds)and New or Established Ostomies:  Yes      Primary Nurse eSignature: Electronically signed by Dominick Zamudio RN on 8/14/18 at 9:55 PM

## 2018-08-15 NOTE — PROGRESS NOTES
Result   Findings may represent small bowel obstruction or ileus. XR CHEST PORTABLE   Final Result   Left-sided effusion with associated airspace disease which could represent   atelectasis or pneumonia. CT Head WO Contrast   Final Result   1. No acute intracranial abnormality. Assessment/Plan:    Active Hospital Problems    Diagnosis Date Noted    Septic shock ([facility] [address] [A41.9, R65.21] 08/13/2018       PLAN:    Severe sepsis  Treatment discontinued  Hospice care only    Gram negative bacteremia  Hospice care    Acute GI bleed; Has previously known stomach mass from metastatic pancreas cancer. EGD was planned. Patient has declined and decided to go home with hospice     Anemia of acute blood loss; No more blood draws     Metastatic pancreatic cancer:  Appears to be actively advancing despite receiving chemotherapy. Requested hospice      DVT Prophylaxis: NA  Diet: DIET GENERAL;  Code Status: DNR-CC    PT/OT Eval Status: NA    Dispo - hospice tomorrow.     Handy Rubin MD

## 2018-08-15 NOTE — PROGRESS NOTES
Bedside report received. 4 eyed skin assessment completed. No evidence of skin breakdown. Mepilex dsg on sacrum. Pt alert and oriented x4. Pt denies pain. Pt on RA SpO2 96%, NSR 90s, . F/c- UOP 655mL on dayshift. Alarms on and audible. Will continue to monitor.   Wilber Holguin RN

## 2018-08-15 NOTE — DISCHARGE SUMMARY
CREATININE 2.7 08/14/2018    CALCIUM 11.5 08/14/2018    PHOS 1.9 07/22/2018         Significant Diagnostic Studies    Radiology:   XR CHEST PORTABLE   Final Result   Left larger than right pleural effusions with associated atelectasis and/or   pneumonia. Right basilar disease appears mildly increased. CT Chest WO Contrast   Final Result   Large left-sided pleural effusion with adjacent compressive atelectasis. Progression of metastatic disease within the liver. No evidence of bowel obstruction although there is possible gastritis and   possible colitis. Evaluation limited due to lack of contrast and collapse of   bowel loops. New moderate amount of ascites. Mass versus adenopathy in the left-sided pelvis, difficult to separate from   sigmoid colon but appears to be the cause of the patient's new left-sided   hydroureteronephrosis. CT ABDOMEN PELVIS WO CONTRAST Additional Contrast? None   Final Result   Large left-sided pleural effusion with adjacent compressive atelectasis. Progression of metastatic disease within the liver. No evidence of bowel obstruction although there is possible gastritis and   possible colitis. Evaluation limited due to lack of contrast and collapse of   bowel loops. New moderate amount of ascites. Mass versus adenopathy in the left-sided pelvis, difficult to separate from   sigmoid colon but appears to be the cause of the patient's new left-sided   hydroureteronephrosis. XR ABDOMEN (KUB) (SINGLE AP VIEW)   Final Result   Findings may represent small bowel obstruction or ileus. XR CHEST PORTABLE   Final Result   Left-sided effusion with associated airspace disease which could represent   atelectasis or pneumonia. CT Head WO Contrast   Final Result   1. No acute intracranial abnormality.                 Consults:     IP CONSULT TO HOSPITALIST  IP CONSULT TO CRITICAL CARE  PHARMACY TO DOSE VANCOMYCIN  IP CONSULT TO GI  IP CONSULT TO HEM/ONC  IP CONSULT TO PALLIATIVE CARE  IP CONSULT TO HOSPICE    Disposition:  Hospice     Condition at Discharge: Terminal    Discharge Instructions/Follow-up:  NA    Code Status:  Limited     Activity: activity as tolerated    Diet: regular diet      Discharge Medications:     Current Discharge Medication List           Details   morphine sulfate 20 MG/ML concentrated oral solution Place 0.25 mLs under the tongue every 2 hours as needed (pain, discomfort, dyspnea) for up to 10 days. HOSPICE PATIENT. Qty: 30 mL, Refills: 0    Associated Diagnoses: Septic shock ([address] Malignant neoplasm of tail of pancreas ([address] Liver metastasis (HCC)      LORazepam (LORAZEPAM INTENSOL) 2 MG/ML concentrated solution Place 0.5 mLs under the tongue every 4 hours as needed (ANXIETY, DISCOMFORT) for up to 10 days. HOSPICE PATIENT. Qty: 30 mL, Refills: 0    Associated Diagnoses: Septic shock ([facility] [address] Malignant neoplasm of tail of pancreas ([facility] [address] Anxiety              Details   lidocaine viscous 2 % SOLN 24 mL, diphenhydrAMINE 12.5 MG/5ML LIQD 48 mL, nystatin 423740 UNIT/ML SUSP 48 mL Swish and swallow 5 mLs 4 times daily as needed for Irritation      finasteride (PROSCAR) 5 MG tablet Take 5 mg by mouth daily      tamsulosin (FLOMAX) 0.4 MG capsule Take 1 capsule by mouth daily  Qty: 30 capsule, Refills: 0      pantoprazole (PROTONIX) 40 MG tablet Take 1 tablet by mouth 2 times daily  Qty: 30 tablet, Refills: 0             Time Spent on discharge is more than 45 minutes in the examination, evaluation, counseling and review of medications and discharge plan. Signed:    Raffy May MD   8/15/2018      Thank you Louis Seth MD for the opportunity to be involved in this patient's care. If you have any questions or concerns please feel free to contact me at [phone].

## 2018-08-15 NOTE — FLOWSHEET NOTE
08/15/18 1023   Encounter Summary   Services provided to: Family   Referral/Consult From: [address] [facility] Family members   Place of [facility] ([facility])   Contact Rastafari Completed   Continue Visiting (8-15, hopes to go home with hospice. Anointed yesterday.)   Complexity of Encounter Moderate   Length of Encounter 15 minutes   Sacraments   Sacrament of Sick-Anointing Visiting  anointed patient  ([facility] by yesterday)   Grief and Life Adjustment   Type End of life; Adjustment to illness   Assessment Calm; Approachable   Intervention Active listening;Explored feelings, thoughts, concerns; Discussed illness/injury and it's impact   Outcome Engaged in conversation;Expressed feelings/needs/concerns;Receptive    follow-up. Patient was anointed yesterday. Spouse states she is meeting with hospice this morning with the hopes of getting patient home. He has been less awake this morning. Offered ongoing prn support as journey unfolds. She requested I let her parish know patient is going into hospice.

## 2018-08-16 NOTE — PROGRESS NOTES
2. 6*  2.7*   CALCIUM  13.9*  11.5*     Recent Labs      08/13/18   0816   AST  26   ALT  33   BILITOT  1.4*   ALKPHOS  435*     Recent Labs      08/13/18   0816   INR  1.50*     Recent Labs      08/13/18   0816   TROPONINI  <0.01       No flowsheet data found. Diagnostic Assesment and Plan :   1. Metastatic pancreatic cancer, family opted hospice, he is waiting on inpatient  hospice bed,  [facility]. 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 Healthcare acquired pneumonia possible gram-negative luis antonio infection, not on antibiotics given he is going to hospice  Body mass index is 19.92 kg/m². DVT Prophylaxis: scd   Diet: DIET GENERAL;  Code Status: DNR-CC    PT/OT Eval Status: . At his/her baseline . The note was completed using EMR. Every effort was made to ensure accuracy; however, inadvertent computerized transcription errors may be present. I have discussed the above stated plan with the patient and  Wife  and they verbalized understanding and agreed with the plan. RN notified about todays plan  bed side. Dispo: will monitor, Discharge in few days to  Hospice .   Needs to stay in hospital for awaits inpt hospice bed.     [phone]

## 2018-08-16 NOTE — CARE COORDINATION
Spoke with Elayne with [facility]. Stated that [place] [id number] has bed opening and patient will transfer there with 4:45.  Brenda Danielson RN

## 2018-08-16 NOTE — PROGRESS NOTES
[facility]:  The [facility] does not have a bed available at this time. Ambulance was cancelled. I will check later in the day to see if a bed becomes available. May look at partnering if bed does not become available. Updated nurse and LISA.   Rachel Muñoz RN BSN

## 2018-08-16 NOTE — CARE COORDINATION
DCP spoke with Honey with [address] States still no IPU beds available at [facility], [place] location. Family does not want [facility]. Hopeful for opening tomorrow. Germán Martínez RN Discussed with Osiris Fonseca possibility of partnering. Another liaison will be in today to arrange.

## 2018-08-20 ENCOUNTER — TELEPHONE (OUTPATIENT)
Dept: PULMONOLOGY | Age: 68
End: 2018-08-20

## 2018-08-20 NOTE — TELEPHONE ENCOUNTER
CONCEPCION.. The office received a fax from Hospice stating Alexxibeth Webber passed away on their unit on 8/17/18

## 2018-09-17 LAB
FUNGUS (MYCOLOGY) CULTURE: NORMAL
FUNGUS STAIN: NORMAL

## 2018-10-02 LAB
AFB CULTURE (MYCOBACTERIA): NORMAL
AFB SMEAR: NORMAL

## (undated) DEVICE — MEDI-VAC NON-CONDUCTIVE SUCTION TUBING: Brand: CARDINAL HEALTH

## (undated) DEVICE — SYSTEM SMK EVAC LAP TBNG FILTER HSNG BENT STYL PNK SEE CLR

## (undated) DEVICE — LOOP OSTOMY BRIDGE - STERILE: Brand: HOLLISTER

## (undated) DEVICE — ELECTRODE BLDE L6.5IN CAUT EXT DISP

## (undated) DEVICE — PUMP SUC IRR TBNG L10FT W/ HNDPC ASSEMB STRYKEFLOW 2

## (undated) DEVICE — DISSECTOR ENDOSCP L39CM JAW L14.5MM 360DEG SHFT ROT STR JAW

## (undated) DEVICE — ELECTRODE ECG MONITR FOAM TEAR DROP ADLT RED

## (undated) DEVICE — SUTURE PDS II SZ 0 L60IN ABSRB VLT L48MM CTX 1/2 CIR Z990G

## (undated) DEVICE — SUTURE PERMA-HAND SZ 2-0 L30IN NONABSORBABLE BLK L26MM SH K833H

## (undated) DEVICE — SOLUTION IV 1000ML LAC RINGERS PH 6.5 INJ USP VIAFLX PLAS

## (undated) DEVICE — SHEET,DRAPE,53X77,STERILE: Brand: MEDLINE

## (undated) DEVICE — TRAY CATH 16FR F INCLUDE LUB DRNGE BG STATLOK STBL DEV

## (undated) DEVICE — TROCAR: Brand: KII FIOS FIRST ENTRY

## (undated) DEVICE — DRESSING FOAM W4XL10IN POLYUR SAFETAC MULTILAYERED ABSRB PD

## (undated) DEVICE — 3M™ TEGADERM™ TRANSPARENT FILM DRESSING FRAME STYLE, 1624W, 2-3/8 IN X 2-3/4 IN (6 CM X 7 CM), 100/CT 4CT/CASE: Brand: 3M™ TEGADERM™

## (undated) DEVICE — SPONGE LAP W18XL18IN WHT COT 4 PLY FLD STRUNG RADPQ DISP ST

## (undated) DEVICE — COLOSTOMY/ILEOSTOMY KIT,FLEXWEAR: Brand: NEW IMAGE

## (undated) DEVICE — DRAPE,UNDERBUTTOCKS,PCH,STERILE: Brand: MEDLINE

## (undated) DEVICE — CONMED SCOPE SAVER BITE BLOCK, 20X27 MM: Brand: SCOPE SAVER

## (undated) DEVICE — Z DISCONTINUED USE 2276105 GOWN PROTCT UNIV CHST W28IN L49IN SL 24IN BLU SPUNBOND FLM

## (undated) DEVICE — PACK PROCEDURE SURG GEN LAPAROSCOPY CDS

## (undated) DEVICE — SUTURE VCRL + SZ 3-0 L18IN ABSRB UD SH 1/2 CIR TAPERCUT NDL VCP864D

## (undated) DEVICE — LEGGINGS, PAIR, 31X48, STERILE: Brand: MEDLINE

## (undated) DEVICE — GOWN,SIRUS,POLYRNF,BRTHSLV,XL,30/CS: Brand: MEDLINE

## (undated) DEVICE — SUTURE PERMAHAND SZ 3-0 L18IN NONABSORBABLE BLK L26MM SH C013D

## (undated) DEVICE — TROCAR: Brand: KII SLEEVE

## (undated) DEVICE — 3M™ TEGADERM™ TRANSPARENT FILM DRESSING FRAME STYLE, 1626W, 4 IN X 4-3/4 IN (10 CM X 12 CM), 50/CT 4CT/CASE: Brand: 3M™ TEGADERM™

## (undated) DEVICE — STAPLER SKIN H3.9MM WIRE DIA0.58MM CRWN 6.9MM 35 STPL FIX

## (undated) DEVICE — PENCIL ES CRD L10FT HND SWCHING ROCK SWCH W/ EDGE COAT BLDE